# Patient Record
Sex: MALE | Race: ASIAN | Employment: UNEMPLOYED | URBAN - METROPOLITAN AREA
[De-identification: names, ages, dates, MRNs, and addresses within clinical notes are randomized per-mention and may not be internally consistent; named-entity substitution may affect disease eponyms.]

---

## 2017-04-21 DIAGNOSIS — K74.60 CHRONIC HEPATITIS B WITH CIRRHOSIS (H): Primary | ICD-10-CM

## 2017-04-21 DIAGNOSIS — B18.1 CHRONIC HEPATITIS B WITH CIRRHOSIS (H): Primary | ICD-10-CM

## 2017-05-01 ENCOUNTER — PRE VISIT (OUTPATIENT)
Dept: GASTROENTEROLOGY | Facility: CLINIC | Age: 57
End: 2017-05-01

## 2017-05-01 NOTE — TELEPHONE ENCOUNTER
Unable to reach pt or leave message on both home and cell phone    Lab orders in EPIC and lab appointment made    Pt due for ultrasound which will need to be set up on discharge after appointment    Nicole Mars CMA

## 2017-05-30 ENCOUNTER — TELEPHONE (OUTPATIENT)
Dept: GASTROENTEROLOGY | Facility: CLINIC | Age: 57
End: 2017-05-30

## 2017-06-16 ENCOUNTER — TELEPHONE (OUTPATIENT)
Dept: GASTROENTEROLOGY | Facility: CLINIC | Age: 57
End: 2017-06-16

## 2017-06-16 DIAGNOSIS — K74.60 CHRONIC HEPATITIS B WITH CIRRHOSIS (H): ICD-10-CM

## 2017-06-16 DIAGNOSIS — B18.1 CHRONIC HEPATITIS B WITH CIRRHOSIS (H): ICD-10-CM

## 2017-06-16 NOTE — TELEPHONE ENCOUNTER
Writer called pharmacy and confirmed that refills will be covered through 7/22/17. Patient updated. Will refill on appointment day.

## 2017-06-22 ENCOUNTER — PRE VISIT (OUTPATIENT)
Dept: GASTROENTEROLOGY | Facility: CLINIC | Age: 57
End: 2017-06-22

## 2017-06-22 NOTE — TELEPHONE ENCOUNTER
Was the patient contacted by phone and reminded of the upcoming visit? Yes    Was the patient instructed to bring a current list of all medications to the appointment or instructed to bring in all medication bottles? Yes, patient verbalized understanding    Is it anticipated the patient will need additional appointments? Yes, Testing    Were the additional appointments scheduled? Yes, reminded pt to fast for 8 hours prior to ultrasound, pt verbally understood and agreed.     Was the patient instructed to arrive prior to the appointment time to have ordered labs drawn? Yes, patient verbalized understanding    Were the needed lab orders placed? Yes    Nicole Mars [facility]  6/22/2017 4:51 PM

## 2017-06-26 DIAGNOSIS — K74.60 CHRONIC HEPATITIS B WITH CIRRHOSIS (H): ICD-10-CM

## 2017-06-26 DIAGNOSIS — B18.1 CHRONIC HEPATITIS B WITH CIRRHOSIS (H): ICD-10-CM

## 2017-06-26 LAB
ALBUMIN SERPL-MCNC: 3.7 G/DL (ref 3.4–5)
ALP SERPL-CCNC: 67 U/L (ref 40–150)
ALT SERPL W P-5'-P-CCNC: 31 U/L (ref 0–70)
ANION GAP SERPL CALCULATED.3IONS-SCNC: 6 MMOL/L (ref 3–14)
AST SERPL W P-5'-P-CCNC: 23 U/L (ref 0–45)
BILIRUB DIRECT SERPL-MCNC: 0.2 MG/DL (ref 0–0.2)
BILIRUB SERPL-MCNC: 0.9 MG/DL (ref 0.2–1.3)
BUN SERPL-MCNC: 21 MG/DL (ref 7–30)
CALCIUM SERPL-MCNC: 8.9 MG/DL (ref 8.5–10.1)
CHLORIDE SERPL-SCNC: 106 MMOL/L (ref 94–109)
CO2 SERPL-SCNC: 29 MMOL/L (ref 20–32)
CREAT SERPL-MCNC: 0.89 MG/DL (ref 0.66–1.25)
ERYTHROCYTE [DISTWIDTH] IN BLOOD BY AUTOMATED COUNT: 13.1 % (ref 10–15)
GFR SERPL CREATININE-BSD FRML MDRD: 88 ML/MIN/1.7M2
GLUCOSE SERPL-MCNC: 96 MG/DL (ref 70–99)
HCT VFR BLD AUTO: 43.3 % (ref 40–53)
HGB BLD-MCNC: 15.2 G/DL (ref 13.3–17.7)
INR PPP: 0.98 (ref 0.86–1.14)
MCH RBC QN AUTO: 31.1 PG (ref 26.5–33)
MCHC RBC AUTO-ENTMCNC: 35.1 G/DL (ref 31.5–36.5)
MCV RBC AUTO: 89 FL (ref 78–100)
PLATELET # BLD AUTO: 92 10E9/L (ref 150–450)
POTASSIUM SERPL-SCNC: 3.9 MMOL/L (ref 3.4–5.3)
PROT SERPL-MCNC: 7.1 G/DL (ref 6.8–8.8)
RBC # BLD AUTO: 4.88 10E12/L (ref 4.4–5.9)
SODIUM SERPL-SCNC: 141 MMOL/L (ref 133–144)
WBC # BLD AUTO: 3.6 10E9/L (ref 4–11)

## 2017-06-26 PROCEDURE — 87517 HEPATITIS B DNA QUANT: CPT | Performed by: INTERNAL MEDICINE

## 2017-06-26 PROCEDURE — 85027 COMPLETE CBC AUTOMATED: CPT | Performed by: INTERNAL MEDICINE

## 2017-06-26 PROCEDURE — 85610 PROTHROMBIN TIME: CPT | Performed by: INTERNAL MEDICINE

## 2017-06-26 PROCEDURE — 80048 BASIC METABOLIC PNL TOTAL CA: CPT | Performed by: INTERNAL MEDICINE

## 2017-06-26 PROCEDURE — 36415 COLL VENOUS BLD VENIPUNCTURE: CPT | Performed by: INTERNAL MEDICINE

## 2017-06-26 PROCEDURE — 80076 HEPATIC FUNCTION PANEL: CPT | Performed by: INTERNAL MEDICINE

## 2017-06-27 LAB
HBV DNA SERPL NAA+PROBE-ACNC: NORMAL [IU]/ML
HBV DNA SERPL NAA+PROBE-LOG IU: NORMAL {LOG_IU}/ML

## 2017-06-30 RX ORDER — ENTECAVIR 0.5 MG/1
1 TABLET, FILM COATED ORAL DAILY
Qty: 60 TABLET | Refills: 11 | Status: SHIPPED | OUTPATIENT
Start: 2017-06-30 | End: 2018-06-18

## 2017-07-03 ENCOUNTER — OFFICE VISIT (OUTPATIENT)
Dept: GASTROENTEROLOGY | Facility: CLINIC | Age: 57
End: 2017-07-03
Attending: INTERNAL MEDICINE
Payer: COMMERCIAL

## 2017-07-03 VITALS
HEIGHT: 67 IN | WEIGHT: 122 LBS | BODY MASS INDEX: 19.15 KG/M2 | SYSTOLIC BLOOD PRESSURE: 130 MMHG | DIASTOLIC BLOOD PRESSURE: 76 MMHG | HEART RATE: 76 BPM

## 2017-07-03 DIAGNOSIS — B18.1 CHRONIC HEPATITIS B WITH CIRRHOSIS (H): Primary | ICD-10-CM

## 2017-07-03 DIAGNOSIS — K74.60 CHRONIC HEPATITIS B WITH CIRRHOSIS (H): Primary | ICD-10-CM

## 2017-07-03 PROCEDURE — 99212 OFFICE O/P EST SF 10 MIN: CPT | Mod: ZF

## 2017-07-03 ASSESSMENT — PAIN SCALES - GENERAL: PAINLEVEL: NO PAIN (0)

## 2017-07-03 NOTE — MR AVS SNAPSHOT
After Visit Summary   7/3/2017    Judah Diaz    MRN: [id number]           Patient Information     Date Of Birth          1960        Visit Information        Provider Department      7/3/2017 9:30 AM Julio Barajas MD [facility] Hepatology         Follow-ups after your visit        Follow-up notes from your care team     Return in about 6 months (around 1/3/2018).      Your next 10 appointments already scheduled     Jan 08, 2018  8:00 AM CST   (Arrive by 7:45 AM)   Return General Liver with Julio Beltran MD   [facility] Hepatology ([facility] [place])    [address]  [address] [place] [address] [phone]              Who to contact     If you have questions or need follow up information about today's clinic visit or your schedule please contact [facility] HEPATOLOGY directly at [phone].  Normal or non-critical lab and imaging results will be communicated to you by MyChart, letter or phone within 4 business days after the clinic has received the results. If you do not hear from us within 7 days, please contact the clinic through [facility] or phone. If you have a critical or abnormal lab result, we will notify you by phone as soon as possible.  Submit refill requests through [employer] or call your pharmacy and they will forward the refill request to us. Please allow 3 business days for your refill to be completed.          Additional Information About Your Visit        MyChart Information     [employer] gives you secure access to your electronic health record. If you see a primary care provider, you can also send messages to your care team and make appointments. If you have questions, please call your primary care clinic.  If you do not have a primary care provider, please call [phone] and they will assist you.        Care EveryWhere ID     This is your Care EveryWhere ID. This could be used by other organizations to access your  "[place] medical records  [id number]        Your Vitals Were     Pulse Height BMI (Body Mass Index)             76 1.689 m (5' 6.5\") 19.4 kg/m2          Blood Pressure from Last 3 Encounters:   07/03/17 130/76   11/09/16 131/77   04/08/16 120/77    Weight from Last 3 Encounters:   07/03/17 55.3 kg (122 lb)   11/09/16 56.2 kg (123 lb 12.8 oz)   04/08/16 53.8 kg (118 lb 9.6 oz)              Today, you had the following     No orders found for display       Primary Care Provider Office Phone # Fax #    Elliot Nguyễn [phone]       [facility], [place] [address] [facility] [address]        Goals        General    Psychosocial I want to understand my denial for STD and appeal (pt-stated)     Notes - Note edited  2/2/2016  1:05 PM by Thuy Muñiz LSW    As of today's date 2/2/2016 goal is met at 26 - 50%.   Goal Status:  Showing progress Received assistance with appeal.Waiting,  As of today's date 12/29/2015 goal is met at 0 - 25%.   Goal Status:  Active        Equal Access to Services     OLI PLUMMER : Hadii aad ku hadasho Soomaali, waaxda luqadaha, qaybta kaalmada adeegyada, waxay idiin haypierre rich larobby . So [facility] [phone].    ATENCIÓN: Si habla español, tiene a vazquez disposición servicios gratuitos de asistencia lingüística. Llame al [phone].    We comply with applicable federal civil rights laws and [place] laws. We do not discriminate on the basis of race, color, national origin, age, disability sex, sexual orientation or gender identity.            Thank you!     Thank you for choosing [facility] HEPATOLOGY  for your care. Our goal is always to provide you with excellent care. Hearing back from our patients is one way we can continue to improve our services. Please take a few minutes to complete the written survey that you may receive in the mail after your visit with us. Thank you!             Your Updated Medication List - Protect others around you: " Learn how to safely use, store and throw away your medicines at www.disposemymeds.org.          This list is accurate as of: 7/3/17 10:05 AM.  Always use your most recent med list.                   Brand Name Dispense Instructions for use Diagnosis    entecavir 0.5 MG tablet    BARACLUDE    60 tablet    Take 2 tablets (1 mg) by mouth daily    Chronic hepatitis B with cirrhosis (H)

## 2017-07-03 NOTE — LETTER
"7/3/2017       RE: Judah Diaz  [address]  [place]  [place] [address]     Dear Colleague,    Thank you for referring your patient, Judah Diaz, to the [facility] HEPATOLOGY at [facility]. Please see a copy of my visit note below.    [facility]    Hepatology follow-up    Follow-up visit for HBV cirrhosis    Subjective:  57 year old male    HBV  - dx 2015  - GT-C  - eAg negative, eAb positive  - on entecavir   - HBV DNA undetectable, 6/26/17     Cirrhosis  - dx 2015  - HBV  - hx ascites, resolved with antiviral therapy  - no hx variceal bleed or HE  - EGD Jan 2016- small EV, portal hypertensive gastropathy  - HCC screening- abd U/S Jul 2017    Comes to clinic this AM with son.  Last visit Nov 2016.  No new medications, ER visit or hospital admissions since that time.    Doing well.  Abdomen feels \"tight\" sometimes.  No signs or symptoms specific to liver disease.    Taking Baraclude as prescribed.  No missed doses.  No problems getting medicine.    Patient denies jaundice, lower extremity edema, abdominal distension, lethargy or confusion.    Patient denies melena, hematemesis or hematochezia.    Patient denies fevers, sweats or chills.  Weight stable.    Patient does not drink alcohol.  He continues to work for [employer].      Medical hx Surgical hx   Past Medical History:   Diagnosis Date     Chronic hepatitis B (H)      Cirrhosis (H)      Perforation of tympanic membrane, unspecified      Pupil irregular of right eye 2012      Past Surgical History:   Procedure Laterality Date     ESOPHAGOSCOPY, GASTROSCOPY, DUODENOSCOPY (EGD), COMBINED N/A 1/6/2016    Procedure: COMBINED ESOPHAGOSCOPY, GASTROSCOPY, DUODENOSCOPY (EGD);  Surgeon: Jordan Wilson MD;  [facility]     NO HISTORY OF SURGERY      that he can recall          Medications  Prior to Admission medications    Medication Sig Start Date End Date " "Taking? Authorizing Provider   entecavir (BARACLUDE) 0.5 MG tablet Take 2 tablets (1 mg) by mouth daily 6/30/17  Yes Julio Barajas MD       Allergies  No Known Allergies    Review of systems  A 10-point review of systems was negative    Examination  /76  Pulse 76  Ht 1.689 m (5' 6.5\")  Wt 55.3 kg (122 lb)  BMI 19.4 kg/m2  Body mass index is 19.4 kg/(m^2).    Gen- well, NAD, A+Ox3, normal color  CVS- RRR  RS- CTA  Abd- soft, non-tender, no ascites or organomegaly on palpation or percussion, BS+  Extr- hands normal, no YASMIN  Skin- no rash or jaundice  Neuro- no asterixis  Psych- normal mood    Laboratory  Lab Results   Component Value Date     06/26/2017    POTASSIUM 3.9 06/26/2017    CHLORIDE 106 06/26/2017    CO2 29 06/26/2017    BUN 21 06/26/2017    CR 0.89 06/26/2017       Lab Results   Component Value Date    BILITOTAL 0.9 06/26/2017    ALT 31 06/26/2017    AST 23 06/26/2017    ALKPHOS 67 06/26/2017       Lab Results   Component Value Date    ALBUMIN 3.7 06/26/2017    PROTTOTAL 7.1 06/26/2017        Lab Results   Component Value Date    WBC 3.6 06/26/2017    HGB 15.2 06/26/2017    MCV 89 06/26/2017    PLT 92 06/26/2017       Lab Results   Component Value Date    INR 0.98 06/26/2017       Radiology  Abd U/S July 2017 reviewed    Assessment  57 year old male who presents to clinic for routine follow-up of previously decompensated HBV cirrhosis complicated with ascites and jaundice.  MELD-Na= 6.  Liver function tests normal on antiviral therapy.  No evidence of ascites or hepatic encephalopathy.  Up to date with HCC screening.  Up to date with surveillance of esophageal varices.    Plan  1.  Continue entecavir 1mg PO Q24  2.  Low Na diet  3.  Follow-up in 6 months    Julio Young MD  Hepatology  [facility]        "

## 2017-07-03 NOTE — PROGRESS NOTES
"[facility]    Hepatology follow-up    Follow-up visit for HBV cirrhosis    Subjective:  57 year old male    HBV  - dx 2015  - GT-C  - eAg negative, eAb positive  - on entecavir   - HBV DNA undetectable, 6/26/17     Cirrhosis  - dx 2015  - HBV  - hx ascites, resolved with antiviral therapy  - no hx variceal bleed or HE  - EGD Jan 2016- small EV, portal hypertensive gastropathy  - HCC screening- abd U/S Jul 2017    Comes to clinic this AM with son.  Last visit Nov 2016.  No new medications, ER visit or hospital admissions since that time.    Doing well.  Abdomen feels \"tight\" sometimes.  No signs or symptoms specific to liver disease.    Taking Baraclude as prescribed.  No missed doses.  No problems getting medicine.    Patient denies jaundice, lower extremity edema, abdominal distension, lethargy or confusion.    Patient denies melena, hematemesis or hematochezia.    Patient denies fevers, sweats or chills.  Weight stable.    Patient does not drink alcohol.  He continues to work for [employer].      Medical hx Surgical hx   Past Medical History:   Diagnosis Date     Chronic hepatitis B (H)      Cirrhosis (H)      Perforation of tympanic membrane, unspecified      Pupil irregular of right eye 2012      Past Surgical History:   Procedure Laterality Date     ESOPHAGOSCOPY, GASTROSCOPY, DUODENOSCOPY (EGD), COMBINED N/A 1/6/2016    Procedure: COMBINED ESOPHAGOSCOPY, GASTROSCOPY, DUODENOSCOPY (EGD);  Surgeon: Jordan Wilson MD;  Location:  GI     NO HISTORY OF SURGERY      that he can recall          Medications  Prior to Admission medications    Medication Sig Start Date End Date Taking? Authorizing Provider   entecavir (BARACLUDE) 0.5 MG tablet Take 2 tablets (1 mg) by mouth daily 6/30/17  Yes Julio Barajas MD       Allergies  No Known Allergies    Review of systems  A 10-point review of systems was negative    Examination  /76  Pulse 76  Ht 1.689 m (5' " "6.5\")  Wt 55.3 kg (122 lb)  BMI 19.4 kg/m2  Body mass index is 19.4 kg/(m^2).    Gen- well, NAD, A+Ox3, normal color  CVS- RRR  RS- CTA  Abd- soft, non-tender, no ascites or organomegaly on palpation or percussion, BS+  Extr- hands normal, no YASMIN  Skin- no rash or jaundice  Neuro- no asterixis  Psych- normal mood    Laboratory  Lab Results   Component Value Date     06/26/2017    POTASSIUM 3.9 06/26/2017    CHLORIDE 106 06/26/2017    CO2 29 06/26/2017    BUN 21 06/26/2017    CR 0.89 06/26/2017       Lab Results   Component Value Date    BILITOTAL 0.9 06/26/2017    ALT 31 06/26/2017    AST 23 06/26/2017    ALKPHOS 67 06/26/2017       Lab Results   Component Value Date    ALBUMIN 3.7 06/26/2017    PROTTOTAL 7.1 06/26/2017        Lab Results   Component Value Date    WBC 3.6 06/26/2017    HGB 15.2 06/26/2017    MCV 89 06/26/2017    PLT 92 06/26/2017       Lab Results   Component Value Date    INR 0.98 06/26/2017       Radiology  Abd U/S July 2017 reviewed    Assessment  57 year old male who presents to clinic for routine follow-up of previously decompensated HBV cirrhosis complicated with ascites and jaundice.  MELD-Na= 6.  Liver function tests normal on antiviral therapy.  No evidence of ascites or hepatic encephalopathy.  Up to date with HCC screening.  Up to date with surveillance of esophageal varices.    Plan  1.  Continue entecavir 1mg PO Q24  2.  Low Na diet  3.  Follow-up in 6 months    Julio Young MD  Hepatology  [facility]  "

## 2017-07-03 NOTE — NURSING NOTE
"Chief Complaint   Patient presents with     RECHECK     Cirrhosis, Hep B??       Initial /76  Pulse 76  Ht 1.689 m (5' 6.5\")  Wt 55.3 kg (122 lb)  BMI 19.4 kg/m2 Estimated body mass index is 19.4 kg/(m^2) as calculated from the following:    Height as of this encounter: 1.689 m (5' 6.5\").    Weight as of this encounter: 55.3 kg (122 lb).  Medication Reconciliation: complete  "

## 2017-12-14 DIAGNOSIS — B18.1 CHRONIC HEPATITIS B WITH CIRRHOSIS (H): Primary | ICD-10-CM

## 2017-12-14 DIAGNOSIS — K74.60 CHRONIC HEPATITIS B WITH CIRRHOSIS (H): Primary | ICD-10-CM

## 2018-01-08 ENCOUNTER — OFFICE VISIT (OUTPATIENT)
Dept: GASTROENTEROLOGY | Facility: CLINIC | Age: 58
End: 2018-01-08
Attending: INTERNAL MEDICINE
Payer: COMMERCIAL

## 2018-01-08 VITALS
HEART RATE: 68 BPM | BODY MASS INDEX: 19.53 KG/M2 | SYSTOLIC BLOOD PRESSURE: 113 MMHG | DIASTOLIC BLOOD PRESSURE: 76 MMHG | HEIGHT: 67 IN | TEMPERATURE: 98.2 F | WEIGHT: 124.4 LBS

## 2018-01-08 DIAGNOSIS — B18.1 CHRONIC HEPATITIS B WITH CIRRHOSIS (H): ICD-10-CM

## 2018-01-08 DIAGNOSIS — K74.60 CHRONIC HEPATITIS B WITH CIRRHOSIS (H): Primary | ICD-10-CM

## 2018-01-08 DIAGNOSIS — B18.1 CHRONIC HEPATITIS B WITH CIRRHOSIS (H): Primary | ICD-10-CM

## 2018-01-08 DIAGNOSIS — K74.60 CHRONIC HEPATITIS B WITH CIRRHOSIS (H): ICD-10-CM

## 2018-01-08 LAB
ALBUMIN SERPL-MCNC: 3.7 G/DL (ref 3.4–5)
ALP SERPL-CCNC: 56 U/L (ref 40–150)
ALT SERPL W P-5'-P-CCNC: 29 U/L (ref 0–70)
ANION GAP SERPL CALCULATED.3IONS-SCNC: 5 MMOL/L (ref 3–14)
AST SERPL W P-5'-P-CCNC: 23 U/L (ref 0–45)
BILIRUB DIRECT SERPL-MCNC: 0.2 MG/DL (ref 0–0.2)
BILIRUB SERPL-MCNC: 1 MG/DL (ref 0.2–1.3)
BUN SERPL-MCNC: 16 MG/DL (ref 7–30)
CALCIUM SERPL-MCNC: 8.7 MG/DL (ref 8.5–10.1)
CHLORIDE SERPL-SCNC: 107 MMOL/L (ref 94–109)
CO2 SERPL-SCNC: 26 MMOL/L (ref 20–32)
CREAT SERPL-MCNC: 0.91 MG/DL (ref 0.66–1.25)
ERYTHROCYTE [DISTWIDTH] IN BLOOD BY AUTOMATED COUNT: 12.8 % (ref 10–15)
GFR SERPL CREATININE-BSD FRML MDRD: 85 ML/MIN/1.7M2
GLUCOSE SERPL-MCNC: 106 MG/DL (ref 70–99)
HCT VFR BLD AUTO: 45.4 % (ref 40–53)
HGB BLD-MCNC: 15.4 G/DL (ref 13.3–17.7)
INR PPP: 1.01 (ref 0.86–1.14)
MCH RBC QN AUTO: 29.6 PG (ref 26.5–33)
MCHC RBC AUTO-ENTMCNC: 33.9 G/DL (ref 31.5–36.5)
MCV RBC AUTO: 87 FL (ref 78–100)
PLATELET # BLD AUTO: 96 10E9/L (ref 150–450)
POTASSIUM SERPL-SCNC: 3.7 MMOL/L (ref 3.4–5.3)
PROT SERPL-MCNC: 7.3 G/DL (ref 6.8–8.8)
RBC # BLD AUTO: 5.2 10E12/L (ref 4.4–5.9)
SODIUM SERPL-SCNC: 139 MMOL/L (ref 133–144)
WBC # BLD AUTO: 4 10E9/L (ref 4–11)

## 2018-01-08 PROCEDURE — 80048 BASIC METABOLIC PNL TOTAL CA: CPT | Performed by: INTERNAL MEDICINE

## 2018-01-08 PROCEDURE — G0463 HOSPITAL OUTPT CLINIC VISIT: HCPCS | Mod: ZF

## 2018-01-08 PROCEDURE — 85610 PROTHROMBIN TIME: CPT | Performed by: INTERNAL MEDICINE

## 2018-01-08 PROCEDURE — 85027 COMPLETE CBC AUTOMATED: CPT | Performed by: INTERNAL MEDICINE

## 2018-01-08 PROCEDURE — 87517 HEPATITIS B DNA QUANT: CPT | Performed by: INTERNAL MEDICINE

## 2018-01-08 PROCEDURE — 80076 HEPATIC FUNCTION PANEL: CPT | Performed by: INTERNAL MEDICINE

## 2018-01-08 PROCEDURE — 36415 COLL VENOUS BLD VENIPUNCTURE: CPT | Performed by: INTERNAL MEDICINE

## 2018-01-08 ASSESSMENT — PAIN SCALES - GENERAL: PAINLEVEL: NO PAIN (0)

## 2018-01-08 NOTE — PROGRESS NOTES
[facility]    Hepatology follow-up    Follow-up visit for HBV, cirrhosis    Subjective:  57 year old male    HBV  - dx 2015  - GT-C  - eAg negative, eAb positive  - on entecavir   - HBV DNA undetectable, 6/26/17      Cirrhosis  - dx 2015  - HBV  - hx ascites, resolved with antiviral therapy  - no hx variceal bleed or HE  - EGD Jan 2016- small EV, portal hypertensive gastropathy  - HCC screening- abd U/S Jul 2017    The patient comes to clinic this morning for followup of hepatitis B related cirrhosis.  Last clinic visit was 07/2017.  Since then, the patient has been doing well.  He had an influenza vaccination earlier in the season.      The patient is well.  He is wondering if loose skin under his chin is related to his entecavir.  He does not accept that this may be related to getting older.  He denies any signs or symptoms specific to liver disease.     Patient denies jaundice, lower extremity edema, abdominal distension, lethargy or confusion.    Patient denies melena, hematemesis or hematochezia.    Patient denies fevers, sweats or chills.  Weight stable.    Patient does not drink alcohol.      Medical hx Surgical hx   Past Medical History:   Diagnosis Date     Chronic hepatitis B (H)      Cirrhosis (H)      Perforation of tympanic membrane, unspecified      Pupil irregular of right eye 2012      Past Surgical History:   Procedure Laterality Date     ESOPHAGOSCOPY, GASTROSCOPY, DUODENOSCOPY (EGD), COMBINED N/A 1/6/2016    Procedure: COMBINED ESOPHAGOSCOPY, GASTROSCOPY, DUODENOSCOPY (EGD);  Surgeon: Jordan Wilson MD;  [facility]     NO HISTORY OF SURGERY      that he can recall          Medications  Prior to Admission medications    Medication Sig Start Date End Date Taking? Authorizing Provider   entecavir (BARACLUDE) 0.5 MG tablet Take 2 tablets (1 mg) by mouth daily 6/30/17  Yes Julio Barajas MD       Allergies  No Known Allergies    Review of systems  A  "10-point review of systems was negative    Examination  /76  Pulse 68  Temp 98.2  F (36.8  C) (Oral)  Ht 1.702 m (5' 7\")  Wt 56.4 kg (124 lb 6.4 oz)  BMI 19.48 kg/m2  Body mass index is 19.48 kg/(m^2).    Gen- well, NAD, A+Ox3, normal color  CVS- RRR  RS- CTA  Abd- soft, non-tender, no ascites or organomegaly on palpation or percussion, BS+  Extr- hands normal, no YASMIN  Skin- no rash or jaundice  Neuro- no asterixis  Psych- normal mood    Laboratory  Lab Results   Component Value Date     06/26/2017    POTASSIUM 3.9 06/26/2017    CHLORIDE 106 06/26/2017    CO2 29 06/26/2017    BUN 21 06/26/2017    CR 0.89 06/26/2017       Lab Results   Component Value Date    BILITOTAL 0.9 06/26/2017    ALT 31 06/26/2017    AST 23 06/26/2017    ALKPHOS 67 06/26/2017       Lab Results   Component Value Date    ALBUMIN 3.7 06/26/2017    PROTTOTAL 7.1 06/26/2017        Lab Results   Component Value Date    WBC 3.6 06/26/2017    HGB 15.2 06/26/2017    MCV 89 06/26/2017    PLT 92 06/26/2017       Lab Results   Component Value Date    INR 0.98 06/26/2017       Radiology  Abd U/S July 2017 reviewed    Assessment  57-year-old male who presents to clinic for routine follow-up of a history of decompensated HBV cirrhosis with previous history of ascites and jaundice.  MELD-Na= 6(stable).  Liver function tests remain normal on antiviral therapy.  No evidence of ascites or hepatic encephalopathy today.  Due for HCC screening.  Up-to-date with surveillance of esophageal varices.     Plan  1.  Continue entecavir 1mg PO Q24  2.  Abd U/S  3.  Follow-up in 6 months    Julio Young MD  Hepatology  [facility]  "

## 2018-01-08 NOTE — LETTER
1/8/2018      RE: Judah Diaz  [address]  [place]  [place] [address]       [facility]    Hepatology follow-up    Follow-up visit for HBV, cirrhosis    Subjective:  57 year old male    HBV  - dx 2015  - GT-C  - eAg negative, eAb positive  - on entecavir   - HBV DNA undetectable, 6/26/17      Cirrhosis  - dx 2015  - HBV  - hx ascites, resolved with antiviral therapy  - no hx variceal bleed or HE  - EGD Jan 2016- small EV, portal hypertensive gastropathy  - HCC screening- abd U/S Jul 2017    The patient comes to clinic this morning for followup of hepatitis B related cirrhosis.  Last clinic visit was 07/2017.  Since then, the patient has been doing well.  He had an influenza vaccination earlier in the season.      The patient is well.  He is wondering if loose skin under his chin is related to his entecavir.  He does not accept that this may be related to getting older.  He denies any signs or symptoms specific to liver disease.     Patient denies jaundice, lower extremity edema, abdominal distension, lethargy or confusion.    Patient denies melena, hematemesis or hematochezia.    Patient denies fevers, sweats or chills.  Weight stable.    Patient does not drink alcohol.      Medical hx Surgical hx   Past Medical History:   Diagnosis Date     Chronic hepatitis B (H)      Cirrhosis (H)      Perforation of tympanic membrane, unspecified      Pupil irregular of right eye 2012      Past Surgical History:   Procedure Laterality Date     ESOPHAGOSCOPY, GASTROSCOPY, DUODENOSCOPY (EGD), COMBINED N/A 1/6/2016    Procedure: COMBINED ESOPHAGOSCOPY, GASTROSCOPY, DUODENOSCOPY (EGD);  Surgeon: Jordan Wilson MD;  Location:  GI     NO HISTORY OF SURGERY      that he can recall          Medications  Prior to Admission medications    Medication Sig Start Date End Date Taking? Authorizing Provider   entecavir (BARACLUDE) 0.5 MG tablet Take 2 tablets (1 mg) by mouth daily  "6/30/17  Yes Julio Barajas MD       Allergies  No Known Allergies    Review of systems  A 10-point review of systems was negative    Examination  /76  Pulse 68  Temp 98.2  F (36.8  C) (Oral)  Ht 1.702 m (5' 7\")  Wt 56.4 kg (124 lb 6.4 oz)  BMI 19.48 kg/m2  Body mass index is 19.48 kg/(m^2).    Gen- well, NAD, A+Ox3, normal color  CVS- RRR  RS- CTA  Abd- soft, non-tender, no ascites or organomegaly on palpation or percussion, BS+  Extr- hands normal, no YASMIN  Skin- no rash or jaundice  Neuro- no asterixis  Psych- normal mood    Laboratory  Lab Results   Component Value Date     06/26/2017    POTASSIUM 3.9 06/26/2017    CHLORIDE 106 06/26/2017    CO2 29 06/26/2017    BUN 21 06/26/2017    CR 0.89 06/26/2017       Lab Results   Component Value Date    BILITOTAL 0.9 06/26/2017    ALT 31 06/26/2017    AST 23 06/26/2017    ALKPHOS 67 06/26/2017       Lab Results   Component Value Date    ALBUMIN 3.7 06/26/2017    PROTTOTAL 7.1 06/26/2017        Lab Results   Component Value Date    WBC 3.6 06/26/2017    HGB 15.2 06/26/2017    MCV 89 06/26/2017    PLT 92 06/26/2017       Lab Results   Component Value Date    INR 0.98 06/26/2017       Radiology  Abd U/S July 2017 reviewed    Assessment  57-year-old male who presents to clinic for routine follow-up of a history of decompensated HBV cirrhosis with previous history of ascites and jaundice.  MELD-Na= 6(stable).  Liver function tests remain normal on antiviral therapy.  No evidence of ascites or hepatic encephalopathy today.  Due for HCC screening.  Up-to-date with surveillance of esophageal varices.     Plan  1.  Continue entecavir 1mg PO Q24  2.  Abd U/S  3.  Follow-up in 6 months    Julio Young MD  Hepatology  [facility]        "

## 2018-01-08 NOTE — MR AVS SNAPSHOT
After Visit Summary   1/8/2018    Judah Diaz    MRN: [id number]           Patient Information     Date Of Birth          1960        Visit Information        Provider Department      1/8/2018 8:00 AM Julio Barajas MD [facility] Hepatology         Follow-ups after your visit        Follow-up notes from your care team     Return in about 6 months (around 7/8/2018).      Your next 10 appointments already scheduled     Jan 08, 2018  8:45 AM CST   Lab with  LAB   [facility] Lab ([facility])    [address] [place] [address] [phone]            Jan 13, 2018  8:00 AM CST   US ABDOMEN COMPLETE with UCUS3   [facility] US ([facility])    [address] [place], [place] [address] [phone]           Please bring a list of your medicines (including vitamins, minerals and over-the-counter drugs). Also, tell your doctor about any allergies you may have. Wear comfortable clothes and leave your valuables at home.  Adults: No eating or drinking for 8 hours before the exam. You may take medicine with a small sip of water.  Children: - Children 6+ years: No food or drink for 6 hours before exam. - Children 1-5 years: No food or drink for 4 hours before exam. - Infants, breast-fed: may have breast milk up to 2 hours before exam. - Infants, formula: may have bottle until 4 hours before exam.  Please call the Imaging Department at your exam site with any questions.            Jul 17, 2018  7:00 AM CDT   Lab with  LAB   [facility] Lab ([facility])    [address] [place] [address] [phone]            Jul 17, 2018  8:00 AM CDT   (Arrive by 7:45 AM)   Return General Liver with Julio Beltran MD   [facility] Hepatology ([facility])    [address] [place] [address]  [address] [place] [address]  "  [phone]              Who to contact     If you have questions or need follow up information about today's clinic visit or your schedule please contact [facility] HEPATOLOGY directly at [phone].  Normal or non-critical lab and imaging results will be communicated to you by MyChart, letter or phone within 4 business days after the clinic has received the results. If you do not hear from us within 7 days, please contact the clinic through MyChart or phone. If you have a critical or abnormal lab result, we will notify you by phone as soon as possible.  Submit refill requests through [employer] or call your pharmacy and they will forward the refill request to us. Please allow 3 business days for your refill to be completed.          Additional Information About Your Visit        [employer] Information     [employer] gives you secure access to your electronic health record. If you see a primary care provider, you can also send messages to your care team and make appointments. If you have questions, please call your primary care clinic.  If you do not have a primary care provider, please call [phone] and they will assist you.        Care EveryWhere ID     This is your Care EveryWhere ID. This could be used by other organizations to access your [place] medical records  [id number]        Your Vitals Were     Pulse Temperature Height BMI (Body Mass Index)          68 98.2  F (36.8  C) (Oral) 1.702 m (5' 7\") 19.48 kg/m2         Blood Pressure from Last 3 Encounters:   01/08/18 113/76   07/03/17 130/76   11/09/16 131/77    Weight from Last 3 Encounters:   01/08/18 56.4 kg (124 lb 6.4 oz)   07/03/17 55.3 kg (122 lb)   11/09/16 56.2 kg (123 lb 12.8 oz)              Today, you had the following     No orders found for display       Primary Care Provider Office Phone # Fax #    Elliot Nguyễn [phone]       [address]  [facility] [address]        Goals        General    Psychosocial I want to " understand my denial for STD and appeal (pt-stated)     Notes - Note edited  2/2/2016  1:05 PM by Thuy Muñiz LSW    As of today's date 2/2/2016 goal is met at 26 - 50%.   Goal Status:  Showing progress Received assistance with appeal.Waiting,  As of today's date 12/29/2015 goal is met at 0 - 25%.   Goal Status:  Active        Equal Access to Services     [facility]: Hadii aad ku hadasho Soomaali, waaxda luqadaha, qaybta kaalmada adeegyada, waxay idiin hayaan adeeg kharash la'aan ah. So [facility] [phone].    ATENCIÓN: Si habla español, tiene a vazquez disposición servicios gratuitos de asistencia lingüística. Llame al [phone].    We comply with applicable federal civil rights laws and [place] laws. We do not discriminate on the basis of race, color, national origin, age, disability, sex, sexual orientation, or gender identity.            Thank you!     Thank you for choosing [facility] HEPATOLOGY  for your care. Our goal is always to provide you with excellent care. Hearing back from our patients is one way we can continue to improve our services. Please take a few minutes to complete the written survey that you may receive in the mail after your visit with us. Thank you!             Your Updated Medication List - Protect others around you: Learn how to safely use, store and throw away your medicines at www.disposemymeds.org.          This list is accurate as of: 1/8/18  8:35 AM.  Always use your most recent med list.                   Brand Name Dispense Instructions for use Diagnosis    entecavir 0.5 MG tablet    BARACLUDE    60 tablet    Take 2 tablets (1 mg) by mouth daily    Chronic hepatitis B with cirrhosis (H)

## 2018-01-08 NOTE — NURSING NOTE
"Chief Complaint   Patient presents with     RECHECK     follow up with Hep B and cirrhosis, tzimmer cma       Initial /76  Pulse 68  Temp 98.2  F (36.8  C) (Oral)  Ht 1.702 m (5' 7\")  Wt 56.4 kg (124 lb 6.4 oz)  BMI 19.48 kg/m2 Estimated body mass index is 19.48 kg/(m^2) as calculated from the following:    Height as of this encounter: 1.702 m (5' 7\").    Weight as of this encounter: 56.4 kg (124 lb 6.4 oz).  Medication Reconciliation: complete    "

## 2018-01-09 LAB
HBV DNA SERPL NAA+PROBE-ACNC: NORMAL [IU]/ML
HBV DNA SERPL NAA+PROBE-LOG IU: NORMAL {LOG_IU}/ML

## 2018-01-13 ENCOUNTER — RADIANT APPOINTMENT (OUTPATIENT)
Dept: ULTRASOUND IMAGING | Facility: CLINIC | Age: 58
End: 2018-01-13
Attending: INTERNAL MEDICINE
Payer: COMMERCIAL

## 2018-01-13 DIAGNOSIS — B18.1 CHRONIC HEPATITIS B WITH CIRRHOSIS (H): ICD-10-CM

## 2018-01-13 DIAGNOSIS — K74.60 CHRONIC HEPATITIS B WITH CIRRHOSIS (H): ICD-10-CM

## 2018-06-18 DIAGNOSIS — K74.60 CHRONIC HEPATITIS B WITH CIRRHOSIS (H): ICD-10-CM

## 2018-06-18 DIAGNOSIS — B18.1 CHRONIC HEPATITIS B WITH CIRRHOSIS (H): ICD-10-CM

## 2018-06-18 RX ORDER — ENTECAVIR 0.5 MG/1
TABLET, FILM COATED ORAL
Qty: 60 TABLET | Refills: 10 | Status: SHIPPED | OUTPATIENT
Start: 2018-06-18 | End: 2018-08-27

## 2018-06-20 DIAGNOSIS — B18.1 CHRONIC HEPATITIS B WITH CIRRHOSIS (H): Primary | ICD-10-CM

## 2018-06-20 DIAGNOSIS — K74.60 CHRONIC HEPATITIS B WITH CIRRHOSIS (H): Primary | ICD-10-CM

## 2018-07-15 DIAGNOSIS — K74.60 CHRONIC HEPATITIS B WITH CIRRHOSIS (H): ICD-10-CM

## 2018-07-15 DIAGNOSIS — B18.1 CHRONIC HEPATITIS B WITH CIRRHOSIS (H): ICD-10-CM

## 2018-07-15 LAB
ERYTHROCYTE [DISTWIDTH] IN BLOOD BY AUTOMATED COUNT: 13.1 % (ref 10–15)
HCT VFR BLD AUTO: 42.3 % (ref 40–53)
HGB BLD-MCNC: 14.6 G/DL (ref 13.3–17.7)
MCH RBC QN AUTO: 30.4 PG (ref 26.5–33)
MCHC RBC AUTO-ENTMCNC: 34.5 G/DL (ref 31.5–36.5)
MCV RBC AUTO: 88 FL (ref 78–100)
PLATELET # BLD AUTO: 106 10E9/L (ref 150–450)
RBC # BLD AUTO: 4.8 10E12/L (ref 4.4–5.9)
WBC # BLD AUTO: 4 10E9/L (ref 4–11)

## 2018-07-15 PROCEDURE — 80076 HEPATIC FUNCTION PANEL: CPT | Performed by: INTERNAL MEDICINE

## 2018-07-15 PROCEDURE — 80048 BASIC METABOLIC PNL TOTAL CA: CPT | Performed by: INTERNAL MEDICINE

## 2018-07-15 PROCEDURE — 85027 COMPLETE CBC AUTOMATED: CPT | Performed by: INTERNAL MEDICINE

## 2018-07-15 PROCEDURE — 85610 PROTHROMBIN TIME: CPT | Performed by: INTERNAL MEDICINE

## 2018-07-15 PROCEDURE — 36415 COLL VENOUS BLD VENIPUNCTURE: CPT | Performed by: INTERNAL MEDICINE

## 2018-07-15 PROCEDURE — 87517 HEPATITIS B DNA QUANT: CPT | Performed by: INTERNAL MEDICINE

## 2018-07-16 LAB
ALBUMIN SERPL-MCNC: 3.8 G/DL (ref 3.4–5)
ALP SERPL-CCNC: 58 U/L (ref 40–150)
ALT SERPL W P-5'-P-CCNC: 32 U/L (ref 0–70)
ANION GAP SERPL CALCULATED.3IONS-SCNC: 8 MMOL/L (ref 3–14)
AST SERPL W P-5'-P-CCNC: 30 U/L (ref 0–45)
BILIRUB DIRECT SERPL-MCNC: 0.2 MG/DL (ref 0–0.2)
BILIRUB SERPL-MCNC: 0.8 MG/DL (ref 0.2–1.3)
BUN SERPL-MCNC: 23 MG/DL (ref 7–30)
CALCIUM SERPL-MCNC: 8.6 MG/DL (ref 8.5–10.1)
CHLORIDE SERPL-SCNC: 109 MMOL/L (ref 94–109)
CO2 SERPL-SCNC: 25 MMOL/L (ref 20–32)
CREAT SERPL-MCNC: 0.92 MG/DL (ref 0.66–1.25)
GFR SERPL CREATININE-BSD FRML MDRD: 84 ML/MIN/1.7M2
GLUCOSE SERPL-MCNC: 101 MG/DL (ref 70–99)
INR PPP: 0.99 (ref 0.86–1.14)
POTASSIUM SERPL-SCNC: 3.9 MMOL/L (ref 3.4–5.3)
PROT SERPL-MCNC: 7 G/DL (ref 6.8–8.8)
SODIUM SERPL-SCNC: 142 MMOL/L (ref 133–144)

## 2018-07-17 ENCOUNTER — OFFICE VISIT (OUTPATIENT)
Dept: GASTROENTEROLOGY | Facility: CLINIC | Age: 58
End: 2018-07-17
Attending: INTERNAL MEDICINE
Payer: COMMERCIAL

## 2018-07-17 VITALS
DIASTOLIC BLOOD PRESSURE: 81 MMHG | WEIGHT: 118.6 LBS | BODY MASS INDEX: 18.62 KG/M2 | OXYGEN SATURATION: 98 % | HEIGHT: 67 IN | TEMPERATURE: 97.9 F | SYSTOLIC BLOOD PRESSURE: 134 MMHG | HEART RATE: 50 BPM

## 2018-07-17 DIAGNOSIS — K74.60 CHRONIC HEPATITIS B WITH CIRRHOSIS (H): Primary | ICD-10-CM

## 2018-07-17 DIAGNOSIS — B18.1 CHRONIC HEPATITIS B WITH CIRRHOSIS (H): Primary | ICD-10-CM

## 2018-07-17 LAB
HBV DNA SERPL NAA+PROBE-ACNC: NORMAL [IU]/ML
HBV DNA SERPL NAA+PROBE-LOG IU: NORMAL {LOG_IU}/ML

## 2018-07-17 PROCEDURE — G0463 HOSPITAL OUTPT CLINIC VISIT: HCPCS | Mod: ZF

## 2018-07-17 ASSESSMENT — PAIN SCALES - GENERAL: PAINLEVEL: NO PAIN (0)

## 2018-07-17 NOTE — MR AVS SNAPSHOT
After Visit Summary   7/17/2018    Judah Diaz    MRN: [id number]           Patient Information     Date Of Birth          1960        Visit Information        Provider Department      7/17/2018 8:00 AM Julio Barajas MD [facility] Hepatology        Today's Diagnoses     Chronic hepatitis B with cirrhosis (H)    -  1       Follow-ups after your visit        Follow-up notes from your care team     Return in about 6 months (around 1/17/2019).      Your next 10 appointments already scheduled     Aug 06, 2018  8:15 AM CDT   US ABDOMEN COMPLETE with BKUS1   [facility] ([facility])    [address] [place] [address] [phone]           Please bring a list of your medicines (including vitamins, minerals and over-the-counter drugs). Also, tell your doctor about any allergies you may have. Wear comfortable clothes and leave your valuables at home.  Adults: No eating, smoking, gum chewing or drinking for 8 hours before the exam. You may take medicine with a small sip of water.  Children: - Infants, breast-fed: may have breast milk up to 2 hours before exam. - Infants, formula: may have bottle until 4 hours before exam. - Children 1-5 years: No food or drink for 4 hours before exam. - Children 6 -12 years: No food or drink for 6 hours before exam. - Children over 12 years: No food or drink for 8 hours before exam. - J Tube Fed: No need to stop feedings.  Please call the Imaging Department at your exam site with any questions.            Roberto 15, 2019  7:30 AM CST   Lab with  LAB   [facility] Lab [facility])    [address] [place] [address] [phone]            Roberto 15, 2019  8:30 AM CST   (Arrive by 8:15 AM)   Return General Liver with Julio Beltran MD   [facility] Hepatology ([facility])    [address] [place] [address]  [address] [place] [address]  "[place] [id number] [phone]              Future tests that were ordered for you today     Open Future Orders        Priority Expected Expires Ordered    US Abdomen Complete Routine 7/18/2018 7/17/2019 7/17/2018    UPPER GI ENDOSCOPY Routine  8/31/2018 7/17/2018            Who to contact     If you have questions or need follow up information about today's clinic visit or your schedule please contact [facility] HEPATOLOGY directly at [phone].  Normal or non-critical lab and imaging results will be communicated to you by Ch[employer]an[employer], letter or phone within 4 business days after the clinic has received the results. If you do not hear from us within 7 days, please contact the clinic through [employer] or phone. If you have a critical or abnormal lab result, we will notify you by phone as soon as possible.  Submit refill requests through [employer] or call your pharmacy and they will forward the refill request to us. Please allow 3 business days for your refill to be completed.          Additional Information About Your Visit        [employer] Information     [employer] gives you secure access to your electronic health record. If you see a primary care provider, you can also send messages to your care team and make appointments. If you have questions, please call your primary care clinic.  If you do not have a primary care provider, please call [phone] and they will assist you.        Care EveryWhere ID     This is your Care EveryWhere ID. This could be used by other organizations to access your [place] medical records  [id number]        Your Vitals Were     Pulse Temperature Height Pulse Oximetry BMI (Body Mass Index)       50 97.9  F (36.6  C) (Oral) 1.702 m (5' 7\") 98% 18.58 kg/m2        Blood Pressure from Last 3 Encounters:   07/17/18 134/81   01/08/18 113/76   07/03/17 130/76    Weight from Last 3 Encounters:   07/17/18 53.8 kg (118 lb 9.6 oz)   01/08/18 56.4 kg (124 lb 6.4 oz)   07/03/17 55.3 kg (122 lb)               " Primary Care Provider Office Phone # Fax #    Elliot Nguyễn [phone]       [address]  [place] [facility] [address]        Goals        General    Psychosocial I want to understand my denial for STD and appeal (pt-stated)     Notes - Note edited  2/2/2016  1:05 PM by Thuy Muñiz LSW    As of today's date 2/2/2016 goal is met at 26 - 50%.   Goal Status:  Showing progress Received assistance with appeal.Waiting,  As of today's date 12/29/2015 goal is met at 0 - 25%.   Goal Status:  Active        Equal Access to Services     [facility]: Hadii aad ku hadasho Soomaali, waaxda luqadaha, qaybta kaalmada adeegyada, waxay idiin hayaan adeclaritza kharazonia alexandra . So [facility] [phone].    ATENCIÓN: Si habla español, tiene a vazquez disposición servicios gratuitos de asistencia lingüística. Llame al [phone].    We comply with applicable federal civil rights laws and [place] laws. We do not discriminate on the basis of race, color, national origin, age, disability, sex, sexual orientation, or gender identity.            Thank you!     Thank you for choosing [facility] HEPATOLOGY  for your care. Our goal is always to provide you with excellent care. Hearing back from our patients is one way we can continue to improve our services. Please take a few minutes to complete the written survey that you may receive in the mail after your visit with us. Thank you!             Your Updated Medication List - Protect others around you: Learn how to safely use, store and throw away your medicines at www.disposemymeds.org.          This list is accurate as of 7/17/18  8:35 AM.  Always use your most recent med list.                   Brand Name Dispense Instructions for use Diagnosis    entecavir 0.5 MG tablet    BARACLUDE    60 tablet    TAKE 2 TABLETS (1 MG) BY MOUTH DAILY    Chronic hepatitis B with cirrhosis (H)

## 2018-07-17 NOTE — NURSING NOTE
Chief Complaint   Patient presents with     RECHECK     Cirrhosis of Liver Hep B   Pt roomed, vitals, meds, and allergies reviewed with pt. Pt ready for provider.  Roberto Bustillos, CMA

## 2018-07-17 NOTE — PROGRESS NOTES
[facility]    Hepatology follow-up    Follow-up visit for HBV cirrhosis    Subjective:  58 year old male    HBV  - dx 2015  - GT-C  - eAg negative, eAb positive  - on entecavir since 2015  - HBV DNA undetectable, 6/26/17      Cirrhosis  - dx 2015  - HBV  - hx ascites, resolved with antiviral therapy  - no hx variceal bleed or HE  - EGD Jan 2016- small EV, portal hypertensive gastropathy  - HCC screening- abd U/S Jan 2018    Comes to clinic this AM with wife and grandson Dannie for follow-up of HBV.  Last clinic visit Jan 2018.  Patient denies new medications, ER visits or hospital admissions since last clinic visit.    Patient is well today.  He denies any signs or symptoms specific to liver disease.    Patient denies jaundice, lower extremity edema, abdominal distension, lethargy or confusion.    Patient denies melena, hematemesis or hematochezia.    Patient denies fevers, sweats or chills.  Weight stable.    Patient takes entecavir every day.  He does not miss doses.  He has no problems obtaining his medications.    Patient does not drink alcohol.  He continues to work for [employer].      Medical hx Surgical hx   Past Medical History:   Diagnosis Date     Chronic hepatitis B (H)      Cirrhosis (H)      Perforation of tympanic membrane, unspecified      Pupil irregular of right eye 2012      Past Surgical History:   Procedure Laterality Date     ESOPHAGOSCOPY, GASTROSCOPY, DUODENOSCOPY (EGD), COMBINED N/A 1/6/2016    Procedure: COMBINED ESOPHAGOSCOPY, GASTROSCOPY, DUODENOSCOPY (EGD);  Surgeon: Jordan Wilson MD;  Location: [facility]     NO HISTORY OF SURGERY      that he can recall          Medications  Prior to Admission medications    Medication Sig Start Date End Date Taking? Authorizing Provider   entecavir (BARACLUDE) 0.5 MG tablet TAKE 2 TABLETS (1 MG) BY MOUTH DAILY 6/18/18  Yes Julio Barajas MD       Allergies  No Known Allergies    Review of systems  A  "10-point review of systems was negative    Examination  /81  Pulse 50  Temp 97.9  F (36.6  C) (Oral)  Ht 1.702 m (5' 7\")  Wt 53.8 kg (118 lb 9.6 oz)  SpO2 98%  BMI 18.58 kg/m2  Body mass index is 18.58 kg/(m^2).    Gen- well, NAD, A+Ox3, normal color  CVS- RRR  RS- CTA  Abd- soft, non-tender, no ascites or organomegaly on palpation or percussion, BS+  Extr- hands normal, no YASMIN  Skin- no rash or jaundice  Neuro- no asterixis  Psych- normal mood    Laboratory  Lab Results   Component Value Date     07/15/2018    POTASSIUM 3.9 07/15/2018    CHLORIDE 109 07/15/2018    CO2 25 07/15/2018    BUN 23 07/15/2018    CR 0.92 07/15/2018       Lab Results   Component Value Date    BILITOTAL 0.8 07/15/2018    ALT 32 07/15/2018    AST 30 07/15/2018    ALKPHOS 58 07/15/2018       Lab Results   Component Value Date    ALBUMIN 3.8 07/15/2018    PROTTOTAL 7.0 07/15/2018        Lab Results   Component Value Date    WBC 4.0 07/15/2018    HGB 14.6 07/15/2018    MCV 88 07/15/2018     07/15/2018       Lab Results   Component Value Date    INR 0.99 07/15/2018       Radiology  Nil recent    Assessment  58 year old male who presents for routine follow-up of previously decompensated HBV cirrhosis.  MELD-Na= 6.  No ascites or hepatic encephalopathy.  Liver function tests normal on entecavir.  Due for HCC screening- will monitor small cyst lesion seen on last U/S.  Due for screening for esophageal varices.    Plan  1.  Follow-up HBV DNA  2.  Abd U/S  3.  EGD  4.  Follow-up in 6 months    Julio Young MD  Hepatology  [facility]  "

## 2018-07-17 NOTE — LETTER
7/17/2018      RE: Judah Diaz  [address]  [place] [address]       [facility]    Hepatology follow-up    Follow-up visit for HBV cirrhosis    Subjective:  58 year old male    HBV  - dx 2015  - GT-C  - eAg negative, eAb positive  - on entecavir since 2015  - HBV DNA undetectable, 6/26/17      Cirrhosis  - dx 2015  - HBV  - hx ascites, resolved with antiviral therapy  - no hx variceal bleed or HE  - EGD Jan 2016- small EV, portal hypertensive gastropathy  - HCC screening- abd U/S Jan 2018    Comes to clinic this AM with wife and grandson Dannie for follow-up of HBV.  Last clinic visit Jan 2018.  Patient denies new medications, ER visits or hospital admissions since last clinic visit.    Patient is well today.  He denies any signs or symptoms specific to liver disease.    Patient denies jaundice, lower extremity edema, abdominal distension, lethargy or confusion.    Patient denies melena, hematemesis or hematochezia.    Patient denies fevers, sweats or chills.  Weight stable.    Patient takes entecavir every day.  He does not miss doses.  He has no problems obtaining his medications.    Patient does not drink alcohol.  He continues to work for [employer].      Medical hx Surgical hx   Past Medical History:   Diagnosis Date     Chronic hepatitis B (H)      Cirrhosis (H)      Perforation of tympanic membrane, unspecified      Pupil irregular of right eye 2012      Past Surgical History:   Procedure Laterality Date     ESOPHAGOSCOPY, GASTROSCOPY, DUODENOSCOPY (EGD), COMBINED N/A 1/6/2016    Procedure: COMBINED ESOPHAGOSCOPY, GASTROSCOPY, DUODENOSCOPY (EGD);  Surgeon: Jordan Wilson MD;  Location:  GI     NO HISTORY OF SURGERY      that he can recall          Medications  Prior to Admission medications    Medication Sig Start Date End Date Taking? Authorizing Provider   entecavir (BARACLUDE) 0.5 MG tablet TAKE 2 TABLETS (1 MG) BY MOUTH DAILY  "6/18/18  Yes Julio Barajas MD       Allergies  No Known Allergies    Review of systems  A 10-point review of systems was negative    Examination  /81  Pulse 50  Temp 97.9  F (36.6  C) (Oral)  Ht 1.702 m (5' 7\")  Wt 53.8 kg (118 lb 9.6 oz)  SpO2 98%  BMI 18.58 kg/m2  Body mass index is 18.58 kg/(m^2).    Gen- well, NAD, A+Ox3, normal color  CVS- RRR  RS- CTA  Abd- soft, non-tender, no ascites or organomegaly on palpation or percussion, BS+  Extr- hands normal, no YASMIN  Skin- no rash or jaundice  Neuro- no asterixis  Psych- normal mood    Laboratory  Lab Results   Component Value Date     07/15/2018    POTASSIUM 3.9 07/15/2018    CHLORIDE 109 07/15/2018    CO2 25 07/15/2018    BUN 23 07/15/2018    CR 0.92 07/15/2018       Lab Results   Component Value Date    BILITOTAL 0.8 07/15/2018    ALT 32 07/15/2018    AST 30 07/15/2018    ALKPHOS 58 07/15/2018       Lab Results   Component Value Date    ALBUMIN 3.8 07/15/2018    PROTTOTAL 7.0 07/15/2018        Lab Results   Component Value Date    WBC 4.0 07/15/2018    HGB 14.6 07/15/2018    MCV 88 07/15/2018     07/15/2018       Lab Results   Component Value Date    INR 0.99 07/15/2018       Radiology  Nil recent    Assessment  58 year old male who presents for routine follow-up of previously decompensated HBV cirrhosis.  MELD-Na= 6.  No ascites or hepatic encephalopathy.  Liver function tests normal on entecavir.  Due for HCC screening- will monitor small cyst lesion seen on last U/S.  Due for screening for esophageal varices.    Plan  1.  Follow-up HBV DNA  2.  Abd U/S  3.  EGD  4.  Follow-up in 6 months    Julio Young MD  Hepatology  [facility]    Julio Young MD      "

## 2018-08-09 ENCOUNTER — TELEPHONE (OUTPATIENT)
Dept: GASTROENTEROLOGY | Facility: CLINIC | Age: 58
End: 2018-08-09

## 2018-08-09 NOTE — TELEPHONE ENCOUNTER
Patient scheduled for EGD     Indication for procedure. Chronic hepatitis B with cirrhosis    Referring Provider. Elliot Nguyễn MD    ? No     Arrival time verified? Patient to arrive at 7 am     Facility location verified? [address]     Instructions given regarding prep and procedure. Transportation policy reviewed and verbalized understanding.     Prep Type NPO     Are you taking any anticoagulants or blood thinners? Denies     Instructions given? Yes     Electronic implanted devices? Denies     Pre procedure teaching completed? Yes    Transportation from procedure? Yes, son     H&P / Pre op physical completed? N/A    Oc Miranda RN

## 2018-08-10 ENCOUNTER — RADIANT APPOINTMENT (OUTPATIENT)
Dept: ULTRASOUND IMAGING | Facility: CLINIC | Age: 58
End: 2018-08-10
Attending: INTERNAL MEDICINE
Payer: COMMERCIAL

## 2018-08-10 DIAGNOSIS — K74.60 CHRONIC HEPATITIS B WITH CIRRHOSIS (H): ICD-10-CM

## 2018-08-10 DIAGNOSIS — B18.1 CHRONIC HEPATITIS B WITH CIRRHOSIS (H): ICD-10-CM

## 2018-08-10 PROCEDURE — 76700 US EXAM ABDOM COMPLETE: CPT

## 2018-08-16 ENCOUNTER — SURGERY (OUTPATIENT)
Age: 58
End: 2018-08-16

## 2018-08-16 ENCOUNTER — HOSPITAL ENCOUNTER (OUTPATIENT)
Facility: CLINIC | Age: 58
Discharge: HOME | End: 2018-08-16
Attending: INTERNAL MEDICINE | Admitting: INTERNAL MEDICINE
Payer: COMMERCIAL

## 2018-08-16 VITALS
SYSTOLIC BLOOD PRESSURE: 129 MMHG | OXYGEN SATURATION: 98 % | DIASTOLIC BLOOD PRESSURE: 88 MMHG | RESPIRATION RATE: 10 BRPM | HEART RATE: 50 BPM

## 2018-08-16 LAB — UPPER GI ENDOSCOPY: NORMAL

## 2018-08-16 PROCEDURE — 40000104 ZZH STATISTIC MODERATE SEDATION < 10 MIN: Performed by: INTERNAL MEDICINE

## 2018-08-16 PROCEDURE — 25000128 H RX IP 250 OP 636: Performed by: INTERNAL MEDICINE

## 2018-08-16 PROCEDURE — 43235 EGD DIAGNOSTIC BRUSH WASH: CPT | Performed by: INTERNAL MEDICINE

## 2018-08-16 RX ORDER — ONDANSETRON 2 MG/ML
4 INJECTION INTRAMUSCULAR; INTRAVENOUS
Status: DISCONTINUED | OUTPATIENT
Start: 2018-08-16 | End: 2018-08-16 | Stop reason: HOSPADM

## 2018-08-16 RX ORDER — FENTANYL CITRATE 50 UG/ML
INJECTION, SOLUTION INTRAMUSCULAR; INTRAVENOUS PRN
Status: DISCONTINUED | OUTPATIENT
Start: 2018-08-16 | End: 2018-08-16 | Stop reason: HOSPADM

## 2018-08-16 RX ORDER — NALOXONE HYDROCHLORIDE 0.4 MG/ML
.1-.4 INJECTION, SOLUTION INTRAMUSCULAR; INTRAVENOUS; SUBCUTANEOUS
Status: CANCELLED | OUTPATIENT
Start: 2018-08-16 | End: 2018-08-17

## 2018-08-16 RX ORDER — ONDANSETRON 4 MG/1
4 TABLET, ORALLY DISINTEGRATING ORAL EVERY 6 HOURS PRN
Status: CANCELLED | OUTPATIENT
Start: 2018-08-16

## 2018-08-16 RX ORDER — FLUMAZENIL 0.1 MG/ML
0.2 INJECTION, SOLUTION INTRAVENOUS
Status: CANCELLED | OUTPATIENT
Start: 2018-08-16 | End: 2018-08-16

## 2018-08-16 RX ORDER — LIDOCAINE 40 MG/G
CREAM TOPICAL
Status: DISCONTINUED | OUTPATIENT
Start: 2018-08-16 | End: 2018-08-16 | Stop reason: HOSPADM

## 2018-08-16 RX ORDER — ONDANSETRON 2 MG/ML
4 INJECTION INTRAMUSCULAR; INTRAVENOUS EVERY 6 HOURS PRN
Status: CANCELLED | OUTPATIENT
Start: 2018-08-16

## 2018-08-16 RX ADMIN — MIDAZOLAM 2 MG: 1 INJECTION INTRAMUSCULAR; INTRAVENOUS at 08:09

## 2018-08-16 RX ADMIN — FENTANYL CITRATE 100 MCG: 50 INJECTION, SOLUTION INTRAMUSCULAR; INTRAVENOUS at 08:09

## 2018-08-16 NOTE — IP AVS SNAPSHOT
[facility], [place], Endoscopy    [address] [facility] [address]    Phone:  [phone]                                       After Visit Summary   8/16/2018    Judah Diaz    MRN: [id number]           After Visit Summary Signature Page     I have received my discharge instructions, and my questions have been answered. I have discussed any challenges I see with this plan with the nurse or doctor.    ..........................................................................................................................................  Patient/Patient Representative Signature      ..........................................................................................................................................  Patient Representative Print Name and Relationship to Patient    ..................................................               ................................................  Date                                            Time    ..........................................................................................................................................  Reviewed by Signature/Title    ...................................................              ..............................................  Date                                                            Time

## 2018-08-16 NOTE — OR NURSING
Procedure: EGD with no interventions  Sedation: Concious  O2: 2L  Tolerated Procedure: Well  Patient returned to recovery room in stable condition with ADELINA Najera RN

## 2018-08-16 NOTE — IP AVS SNAPSHOT
MRN:[id number]                      After Visit Summary   8/16/2018    Judah Diaz    MRN: [id number]           Thank you!     Thank you for choosing [facility] for your care. Our goal is always to provide you with excellent care. Hearing back from our patients is one way we can continue to improve our services. Please take a few minutes to complete the written survey that you may receive in the mail after you visit with us. Thank you!        Patient Information     Date Of Birth          1960        About your hospital stay     You were admitted on:  August 16, 2018 You last received care in the:  [facility], Endoscopy    You were discharged on:  August 16, 2018       Who to Call     For medical emergencies, please call 911.  For non-urgent questions about your medical care, please call your primary care provider or clinic, [phone]  For questions related to your surgery, please call your surgery clinic        Attending Provider     Provider Julio Odom MD Gastroenterology       Primary Care Provider Office Phone # Fax #    Elliot Nguyễn -[phone]      Your next 10 appointments already scheduled     Aug 17, 2018  7:20 AM CDT   Office Visit with Elliot Nguyễn MD   [facility] ([facility])    [address] [place] [address] [phone]           Bring a current list of meds and any records pertaining to this visit. For Physicals, please bring immunization records and any forms needing to be filled out. Please arrive 10 minutes early to complete paperwork.            Roberto 15, 2019  7:30 AM CST   Lab with  LAB   [facility] ([facility])    [address] [address] [phone]            Roberto 15, 2019  8:30 AM CST   (Arrive by 8:15 AM)   Return General Liver with Julio Beltran MD   [facility]  Hepatology ([facility])    [address] [facility]  [address]  [facility] [address] [phone]              Further instructions from your care team       Discharge Instructions after  Upper Endoscopy (EGD)    Activity and Diet  You were given medicine for pain. You may be dizzy or sleepy.  For 24 hours:    Do not drive or use heavy equipment.    Do not make important decisions.    Do not drink any alcohol.  __X_ You may return to your regular diet.    Discomfort  You may have a sore throat for 2 to 3 days. It may help to:    Avoid hot liquids for 24 hours.    Use sore throat lozenges.    Gargle as needed with salt water up to 4 times a day. Mix 1 cup of warm water  with 1 teaspoon of salt. Do not swallow.      You may take Tylenol (acetaminophen) for pain unless your doctor has told you not to.      Other instructions________________________________________________________    When to call us:  Problems are rare. Call right away if you have:    Unusual throat pain or trouble swallowing    Unusual pain in belly or chest that is not relieved by belching or passing air    Black stools (tar-like looking bowel movement)    Temperature above 100.6  F. (37.5  C).    If you vomit blood or have severe pain, go to an emergency room.    If you have questions, call:  Monday to Friday, 7 a.m. to 4:30 p.m.: Endoscopy: [phone] (We may have to call you back)    After hours: Hospital: [phone] (Ask for the GI fellow on call)    Pending Results     No orders found from 8/14/2018 to 8/17/2018.            Admission Information     Date & Time Provider Department Dept. Phone    8/16/2018 Julio Barajas MD [facility] [place], Endoscopy [phone]      Your Vitals Were     Blood Pressure Pulse Respirations Pulse Oximetry          105/69 50 7 100%        MyChart Information     [employer] gives you secure access to your electronic health record. If you see a primary care provider, you can also  send messages to your care team and make appointments. If you have questions, please call your primary care clinic.  If you do not have a primary care provider, please call [phone] and they will assist you.        Care EveryWhere ID     This is your Care EveryWhere ID. This could be used by other organizations to access your [facility] medical records  [id number]        Equal Access to Services     [facility]: Hadii marilou hall Sobora, waaxda luqadaha, qaybta kaalmada adeclaritzayada, iván leblancmehdizonia alexandra . So [facility] [phone].    ATENCIÓN: Si habla español, tiene a vazquez disposición servicios gratuitos de asistencia lingüística. Llame al [phone].    We comply with applicable federal civil rights laws and [place] laws. We do not discriminate on the basis of race, color, national origin, age, disability, sex, sexual orientation, or gender identity.               Review of your medicines      UNREVIEWED medicines. Ask your doctor about these medicines        Dose / Directions    entecavir 0.5 MG tablet   Commonly known as:  BARACLUDE   Used for:  Chronic hepatitis B with cirrhosis (H)        TAKE 2 TABLETS (1 MG) BY MOUTH DAILY   Quantity:  60 tablet   Refills:  10                Protect others around you: Learn how to safely use, store and throw away your medicines at www.disposemymeds.org.             Medication List: This is a list of all your medications and when to take them. Check marks below indicate your daily home schedule. Keep this list as a reference.      Medications           Morning Afternoon Evening Bedtime As Needed    entecavir 0.5 MG tablet   Commonly known as:  BARACLUDE   TAKE 2 TABLETS (1 MG) BY MOUTH DAILY

## 2018-08-16 NOTE — DISCHARGE INSTRUCTIONS
Discharge Instructions after  Upper Endoscopy (EGD)    Activity and Diet  You were given medicine for pain. You may be dizzy or sleepy.  For 24 hours:    Do not drive or use heavy equipment.    Do not make important decisions.    Do not drink any alcohol.  __X_ You may return to your regular diet.    Discomfort  You may have a sore throat for 2 to 3 days. It may help to:    Avoid hot liquids for 24 hours.    Use sore throat lozenges.    Gargle as needed with salt water up to 4 times a day. Mix 1 cup of warm water  with 1 teaspoon of salt. Do not swallow.      You may take Tylenol (acetaminophen) for pain unless your doctor has told you not to.      Other instructions________________________________________________________    When to call us:  Problems are rare. Call right away if you have:    Unusual throat pain or trouble swallowing    Unusual pain in belly or chest that is not relieved by belching or passing air    Black stools (tar-like looking bowel movement)    Temperature above 100.6  F. (37.5  C).    If you vomit blood or have severe pain, go to an emergency room.    If you have questions, call:  Monday to Friday, 7 a.m. to 4:30 p.m.: Endoscopy: [phone] (We may have to call you back)    After hours: Hospital: [phone] (Ask for the GI fellow on call)

## 2018-08-17 ENCOUNTER — OFFICE VISIT (OUTPATIENT)
Dept: FAMILY MEDICINE | Facility: CLINIC | Age: 58
End: 2018-08-17
Payer: COMMERCIAL

## 2018-08-17 VITALS
RESPIRATION RATE: 16 BRPM | HEIGHT: 67 IN | HEART RATE: 60 BPM | BODY MASS INDEX: 18.3 KG/M2 | TEMPERATURE: 98.9 F | DIASTOLIC BLOOD PRESSURE: 76 MMHG | WEIGHT: 116.6 LBS | SYSTOLIC BLOOD PRESSURE: 133 MMHG | OXYGEN SATURATION: 100 %

## 2018-08-17 DIAGNOSIS — K74.60 CHRONIC HEPATITIS B WITH CIRRHOSIS (H): ICD-10-CM

## 2018-08-17 DIAGNOSIS — R18.8 CIRRHOSIS OF LIVER WITH ASCITES, UNSPECIFIED HEPATIC CIRRHOSIS TYPE (H): ICD-10-CM

## 2018-08-17 DIAGNOSIS — K76.6 PORTAL HYPERTENSION (H): Primary | ICD-10-CM

## 2018-08-17 DIAGNOSIS — K74.60 CIRRHOSIS OF LIVER WITH ASCITES, UNSPECIFIED HEPATIC CIRRHOSIS TYPE (H): ICD-10-CM

## 2018-08-17 DIAGNOSIS — B18.1 CHRONIC HEPATITIS B WITH CIRRHOSIS (H): ICD-10-CM

## 2018-08-17 PROCEDURE — 99214 OFFICE O/P EST MOD 30 MIN: CPT | Performed by: FAMILY MEDICINE

## 2018-08-17 ASSESSMENT — PAIN SCALES - GENERAL: PAINLEVEL: NO PAIN (0)

## 2018-08-17 NOTE — PATIENT INSTRUCTIONS
Esophageal Varices     With esophageal varices, blood vessels in the esophagus become abnormally enlarged. They may then burst (rupture) and bleed.   Esophageal varices are enlarged veins at the lower end of the esophagus. The esophagus is the tube that carries food from your mouth to your stomach. Varices most often occur because of problems with blood flow in the liver caused by chronic liver disease. Normally, a blood vessel called the portal vein carries blood from the digestive organs to the liver. But with liver disease, blood flow can be blocked due to scarring of the liver. This increases the blood pressure in the portal vein (a condition known as portal hypertension). Blood then backs up in nearby veins in the esophagus and stomach, causing varices. Varices are a serious and deadly problem. Treatment is needed to prevent them from bursting (rupturing) and bleeding. If bleeding occurs, it can be fatal.  Symptoms of esophageal varices  Symptoms do not occur unless the varices are bleeding. This is an emergency problem. If you have any of the following symptoms, get medical attention right away:    Vomiting blood    Black, tarry, or bloody stools    Feeling lightheaded, or fainting (loss of consciousness)  Diagnosing esophageal \varices  You ll likely be checked for varices if you have liver disease or other health problems that can cause them. Your healthcare provider will ask about your symptoms and health history. You ll also be examined. Tests are then done to confirm the problem. Tests can include:    Upper endoscopy. This is done to see inside the upper digestive tract. During the test, an endoscope is used. This is a thin, flexible tube with a tiny camera on the end. It s inserted through your mouth. It s then guided down through your esophagus, stomach, and first part of your small intestine. This allows the provider to check for varices and find any bleeding.    Imaging tests. These provide pictures  of the liver or blood flow in the liver. They allow the provider to check for enlarged veins around the liver and assess the risk of bleeding. Common imaging tests done include ultrasound and CT scans.  Treating esophageal varices  The goal of treatment is to reduce the risk of bleeding or to control bleeding. Treatment can include 1 or more of the following:    Medicines. These may be prescribed to lower the blood pressure inside the enlarged veins. This reduces the risk of bleeding. Beta-blockers are the most common medicine used.    Endoscopic therapy. These are treatments for enlarged or bleeding veins that are done using an endoscope. With ligation, small rubber bands are placed around the veins to close them off and stop any bleeding. With sclerotherapy, a blood-clotting medicine is injected into the veins to cause scarring and shrink them.    Balloon tamponade. A tube with a balloon is guided down into your esophagus and stomach. The balloon is then filled with air. This puts pressure on enlarged or bleeding veins to control bleeding. This is a short-term (temporary) way to control bleeding until other treatments are available.     Surgery. This may be done to place a tubelike device (stent) in the liver. The stent helps redirect blood flow in the liver to lower the blood pressure in enlarged veins. Sometimes, the enlarged veins may be connected to other nearby veins to redirect blood flow. In severe cases, a liver transplant may be needed. For this surgery, a diseased liver is replaced with a healthy liver from another person.   Follow-up  Regular visits with your provider are needed to check for bleeding of the varices. If bleeding occurs, it is likely to occur again. More treatments will then be needed in the future. Once endoscopic therapy (banding) is performed, regular follow-up endoscopic scans with banding are done to completely get rid of the varices. If you are given medicines to take by mouth, be  sure to take them as directed. Work closely with your provider to manage your condition. Know when to seek emergency care.  Date Last Reviewed: 7/1/2016 2000-2017 [employer]. [address] [place] [address], [place] [address]. All rights reserved. This information is not intended as a substitute for professional medical care. Always follow your healthcare professional's instructions.

## 2018-08-17 NOTE — MR AVS SNAPSHOT
After Visit Summary   8/17/2018    Judah Diaz    MRN: [id number]           Patient Information     Date Of Birth          1960        Visit Information        Provider Department      8/17/2018 7:20 AM Elliot Nguyễn MD [facility]        Today's Diagnoses     Portal hypertension (H)    -  1    Chronic hepatitis B with cirrhosis (H)        Cirrhosis of liver with ascites, unspecified hepatic cirrhosis type (H)           Follow-ups after your visit        Your next 10 appointments already scheduled     Roberto 15, 2019  7:30 AM CST   Lab with  LAB   [facility] Lab ([facility])    [address]  1st Floor  [facility] [id number] [phone]            Roberto 15, 2019  8:30 AM CST   (Arrive by 8:15 AM)   Return General Liver with Julio Beltran MD   [facility] Hepatology ([facility])    [address]  [address]  [facility] [id number] [phone]              Who to contact     If you have questions or need follow up information about today's clinic visit or your schedule please contact [facility] directly at [phone].  Normal or non-critical lab and imaging results will be communicated to you by MyChart, letter or phone within 4 business days after the clinic has received the results. If you do not hear from us within 7 days, please contact the clinic through MyChart or phone. If you have a critical or abnormal lab result, we will notify you by phone as soon as possible.  Submit refill requests through [employer] or call your pharmacy and they will forward the refill request to us. Please allow 3 business days for your refill to be completed.          Additional Information About Your Visit        [employer] Information     [employer] gives you secure access to your electronic health record. If you see a primary care provider, you can also send messages to your care team and  "make appointments. If you have questions, please call your primary care clinic.  If you do not have a primary care provider, please call [phone] and they will assist you.        Care EveryWhere ID     This is your Care EveryWhere ID. This could be used by other organizations to access your [facility] medical records  [id number]        Your Vitals Were     Pulse Temperature Respirations Height Pulse Oximetry BMI (Body Mass Index)    60 98.9  F (37.2  C) (Oral) 16 1.702 m (5' 7\") 100% 18.26 kg/m2       Blood Pressure from Last 3 Encounters:   08/17/18 133/76   08/16/18 129/88   07/17/18 134/81    Weight from Last 3 Encounters:   08/17/18 52.9 kg (116 lb 9.6 oz)   07/17/18 53.8 kg (118 lb 9.6 oz)   01/08/18 56.4 kg (124 lb 6.4 oz)              Today, you had the following     No orders found for display       Primary Care Provider Office Phone # Fax #    Elliot Nguyễn -[phone]       [id number] [place]  [facility] [address]        Goals        General    Psychosocial I want to understand my denial for STD and appeal (pt-stated)     Notes - Note edited  2/2/2016  1:05 PM by Thuy Muñiz LSW    As of today's date 2/2/2016 goal is met at 26 - 50%.   Goal Status:  Showing progress Received assistance with appeal.Waiting,  As of today's date 12/29/2015 goal is met at 0 - 25%.   Goal Status:  Active        Equal Access to Services     [facility] : Hadii marilou lyon hadasho Sobora, waaxda luqadaha, qaybta kaalmaiván ruiz. So [facility] [phone].    ATENCIÓN: Si habla español, tiene a vazquez disposición servicios gratuitos de asistencia lingüística. Llame al [phone].    We comply with applicable federal civil rights laws and [place] laws. We do not discriminate on the basis of race, color, national origin, age, disability, sex, sexual orientation, or gender identity.            Thank you!     Thank you for choosing [facility]" for your care. Our goal is always to provide you with excellent care. Hearing back from our patients is one way we can continue to improve our services. Please take a few minutes to complete the written survey that you may receive in the mail after your visit with us. Thank you!             Your Updated Medication List - Protect others around you: Learn how to safely use, store and throw away your medicines at www.disposemymeds.org.          This list is accurate as of 8/17/18  8:02 AM.  Always use your most recent med list.                   Brand Name Dispense Instructions for use Diagnosis    entecavir 0.5 MG tablet    BARACLUDE    60 tablet    TAKE 2 TABLETS (1 MG) BY MOUTH DAILY    Chronic hepatitis B with cirrhosis (H)

## 2018-08-17 NOTE — PROGRESS NOTES
"  SUBJECTIVE:   Judah Diaz is a 58 year old male who presents to clinic today for the following health issues:  Over 50% of this 25-minute visit is spent face-to-face with the patient, counseling him on interpretation of EGD results and on my recommended treatment of his various problems and coordination of his care, as summarized below:    Follow up liver    Patient would like to discuss results from recent GI procedure (8/16/18).     The patient would also like to know what kind of foods to avoid as to not affect his liver function.      Amount of exercise or physical activity: 2-3 days/week for an average of greater than 60 minutes    Problems taking medications regularly: no    Medication side effects: unsure    Diet: regular (no restrictions)      Past medical, family, and social histories, medications, and allergies are reviewed and updated in [facility].     ROS:  CONSTITUTIONAL: NEGATIVE for fever, chills, change in weight  ENT/MOUTH: NEGATIVE for ear, mouth and throat problems  RESP: NEGATIVE for significant cough or SOB  CV: NEGATIVE for chest pain, palpitations or peripheral edema  ROS otherwise negative    This document serves as a record of the services and decisions personally performed and made by Dr. Nguyễn. It was created on his behalf by Jen Taylor, a trained medical scribe. The creation of this document is based the provider's statements to the medical scribe.  Jen Taylor August 17, 2018 7:35 AM     OBJECTIVE:                                                    /76 (BP Location: Left arm, Patient Position: Chair, Cuff Size: Adult Regular)  Pulse 60  Temp 98.9  F (37.2  C) (Oral)  Resp 16  Ht 1.702 m (5' 7\")  Wt 52.9 kg (116 lb 9.6 oz)  SpO2 100%  BMI 18.26 kg/m2   Body mass index is 18.26 kg/(m^2).     GENERAL: healthy, alert and no distress  EYES: Eyes grossly normal to inspection, PERRL, EOMI, sclerae white and conjunctivae normal  MS: no gross musculoskeletal defects " noted, no edema  SKIN: no suspicious lesions or rashes  NEURO: Normal strength and tone, sensory exam grossly normal, mentation intact, oriented times 3 and cranial nerves 2-12 intact  PSYCH: mentation appears normal, affect normal/bright     Diagnostic Test Results:  Results for orders placed or performed during the hospital encounter of 08/16/18   UPPER GI ENDOSCOPY   Result Value Ref Range    Upper GI Endoscopy       [facility], [address] [place] [address] [phone]     Endoscopy Department  _______________________________________________________________________________  Patient Name: Judah Diaz Procedure Date: 8/16/2018 7:02 AM  MRN: [id number]                       Account Number: [id number]  YOB: 1960              Admit Type: Outpatient  Age: 58                                Gender: Male  Note Status: Finalized                Attending MD: Julio Young MD  Total Sedation Time:                    _______________________________________________________________________________     Procedure:           Upper GI endoscopy  Indications:         Follow-up of esophageal varices  Providers:           Julio Young MD, Nicole Najera RN  Referring MD:        Elliot Nguyễn MD  Medicines:           Midazolam 2 mg IV, Fentanyl 100 micrograms IV  Complications:       No immediate complications.  _________________________________________________ ______________________________  Procedure:           Pre-Anesthesia Assessment:                       - Prior to the procedure, a History and Physical was                        performed, and patient medications and allergies were                        reviewed. The patient is competent. The risks and                        benefits of the procedure and the sedation options and                        risks were discussed with the patient. All questions                        were answered and informed consent was  obtained. Patient                        identification and proposed procedure were verified by                        the physician and the nurse in the pre-procedure area in                        the procedure room. Mental Status Examination: alert and                        oriented. Airway Examination: normal oropharyngeal                        airway and neck mobility. Respiratory Examination: clear                        to auscultation. CV Examination: normal. Proph ylactic                        Antibiotics: The patient does not require prophylactic                        antibiotics. Prior Anticoagulants: The patient has taken                        no previous anticoagulant or antiplatelet agents. ASA                        Grade Assessment: II - A patient with mild systemic                        disease. After reviewing the risks and benefits, the                        patient was deemed in satisfactory condition to undergo                        the procedure. The anesthesia plan was to use moderate                        sedation / analgesia (conscious sedation). Immediately                        prior to administration of medications, the patient was                        re-assessed for adequacy to receive sedatives. The heart                        rate, respiratory rate, oxygen saturations, blood                        pressure, adequacy of pulmonary ventilation, and                        response to care were monitored throughout t he                        procedure. The physical status of the patient was                        re-assessed after the procedure.                       After obtaining informed consent, the endoscope was                        passed under direct vision. Throughout the procedure,                        the patient's blood pressure, pulse, and oxygen                        saturations were monitored continuously. The Endoscope                        was  introduced through the mouth, and advanced to the                        second part of duodenum. The upper GI endoscopy was                        accomplished without difficulty. The patient tolerated                        the procedure well.                                                                                   Findings:       Non-bleeding grade I varices were found in the lower third of the        esophagus,. They were diminutive in size. No stigmata of recent bleeding        were evident and no red sultana signs were pres ent.       Moderate portal hypertensive gastropathy was found in the gastric fundus.       The exam of the stomach was otherwise normal.       The examined duodenum was normal.                                                                                   Moderate Sedation:       Moderate (conscious) sedation was administered by the endoscopy nurse        and supervised by the endoscopist. The following parameters were        monitored: oxygen saturation, heart rate, blood pressure, and response        to care. Total physician intraservice time was 8 minutes.  Impression:          - Non-bleeding grade I esophageal varices.                       - Portal hypertensive gastropathy.                       - Normal examined duodenum.                       - No specimens collected.  Recommendation:      - Discharge patient to home.                       - Resume previous diet.                       - Continue present medications.                       - Repeat upper endoscopy in 2 years fo r surveillance.                       - Return to my office as previously scheduled.                                                                                     ________________  Julio Young MD  8/16/2018 8:23:48 AM  I was physically present for the entire viewing portion of the exam.  __________________________  Signature of teaching physician  Hermann/[id number] Young MD  Number of Addenda:  0    Note Initiated On: 8/16/2018 7:02 AM  Scope In:  Scope Out:          ASSESSMENT/PLAN:                                                      (K76.6) Portal hypertension (H)  (primary encounter diagnosis)  (B18.1,  K74.60) Chronic hepatitis B with cirrhosis (H)  (K74.60) Cirrhosis of liver with ascites, unspecified hepatic cirrhosis type (H)  Comment: EGD report reviewed with patient. His esophageal varices are stable from last EGD.  Plan: next EGD due in 2 years. follow up with liver specialist in January. We discussed details of low sodium diet. Handout and DASH diet provided. UpToDate handouts provided (cirrhosis beyond the basics).    Follow up in 1 year.      The information in this document, created by the medical scribe for me, accurately reflects the services I personally performed and the decisions made by me. I have reviewed and approved this document for accuracy prior to leaving the patient care area. August 17, 2018 7:35 AM     Elliot Nguyễn MD

## 2018-08-27 DIAGNOSIS — K74.60 CHRONIC HEPATITIS B WITH CIRRHOSIS (H): ICD-10-CM

## 2018-08-27 DIAGNOSIS — B18.1 CHRONIC HEPATITIS B WITH CIRRHOSIS (H): ICD-10-CM

## 2018-08-27 RX ORDER — ENTECAVIR 0.5 MG/1
TABLET, FILM COATED ORAL
Qty: 180 TABLET | Refills: 3 | Status: SHIPPED | OUTPATIENT
Start: 2018-08-27 | End: 2019-07-22

## 2019-01-03 DIAGNOSIS — B18.1 CHRONIC HEPATITIS B WITH CIRRHOSIS (H): Primary | ICD-10-CM

## 2019-01-03 DIAGNOSIS — K74.60 CHRONIC HEPATITIS B WITH CIRRHOSIS (H): Primary | ICD-10-CM

## 2019-01-15 ENCOUNTER — OFFICE VISIT (OUTPATIENT)
Dept: GASTROENTEROLOGY | Facility: CLINIC | Age: 59
End: 2019-01-15
Attending: INTERNAL MEDICINE
Payer: COMMERCIAL

## 2019-01-15 VITALS
OXYGEN SATURATION: 100 % | HEIGHT: 67 IN | WEIGHT: 121.8 LBS | SYSTOLIC BLOOD PRESSURE: 148 MMHG | HEART RATE: 51 BPM | BODY MASS INDEX: 19.12 KG/M2 | DIASTOLIC BLOOD PRESSURE: 89 MMHG | TEMPERATURE: 97.6 F | RESPIRATION RATE: 16 BRPM

## 2019-01-15 DIAGNOSIS — K74.60 CHRONIC HEPATITIS B WITH CIRRHOSIS (H): ICD-10-CM

## 2019-01-15 DIAGNOSIS — B18.1 CHRONIC HEPATITIS B WITH CIRRHOSIS (H): ICD-10-CM

## 2019-01-15 DIAGNOSIS — K74.60 CIRRHOSIS OF LIVER WITHOUT ASCITES, UNSPECIFIED HEPATIC CIRRHOSIS TYPE (H): ICD-10-CM

## 2019-01-15 DIAGNOSIS — Z23 NEED FOR PROPHYLACTIC VACCINATION AND INOCULATION AGAINST INFLUENZA: Primary | ICD-10-CM

## 2019-01-15 LAB
ALBUMIN SERPL-MCNC: 3.7 G/DL (ref 3.4–5)
ALP SERPL-CCNC: 62 U/L (ref 40–150)
ALT SERPL W P-5'-P-CCNC: 25 U/L (ref 0–70)
ANION GAP SERPL CALCULATED.3IONS-SCNC: 3 MMOL/L (ref 3–14)
AST SERPL W P-5'-P-CCNC: 20 U/L (ref 0–45)
BILIRUB DIRECT SERPL-MCNC: 0.2 MG/DL (ref 0–0.2)
BILIRUB SERPL-MCNC: 0.7 MG/DL (ref 0.2–1.3)
BUN SERPL-MCNC: 16 MG/DL (ref 7–30)
CALCIUM SERPL-MCNC: 8.3 MG/DL (ref 8.5–10.1)
CHLORIDE SERPL-SCNC: 106 MMOL/L (ref 94–109)
CO2 SERPL-SCNC: 31 MMOL/L (ref 20–32)
CREAT SERPL-MCNC: 0.88 MG/DL (ref 0.66–1.25)
ERYTHROCYTE [DISTWIDTH] IN BLOOD BY AUTOMATED COUNT: 12.2 % (ref 10–15)
GFR SERPL CREATININE-BSD FRML MDRD: >90 ML/MIN/{1.73_M2}
GLUCOSE SERPL-MCNC: 98 MG/DL (ref 70–99)
HCT VFR BLD AUTO: 46.6 % (ref 40–53)
HGB BLD-MCNC: 15.4 G/DL (ref 13.3–17.7)
INR PPP: 0.98 (ref 0.86–1.14)
MCH RBC QN AUTO: 29.4 PG (ref 26.5–33)
MCHC RBC AUTO-ENTMCNC: 33 G/DL (ref 31.5–36.5)
MCV RBC AUTO: 89 FL (ref 78–100)
PLATELET # BLD AUTO: 97 10E9/L (ref 150–450)
POTASSIUM SERPL-SCNC: 4.1 MMOL/L (ref 3.4–5.3)
PROT SERPL-MCNC: 7.2 G/DL (ref 6.8–8.8)
RBC # BLD AUTO: 5.24 10E12/L (ref 4.4–5.9)
SODIUM SERPL-SCNC: 139 MMOL/L (ref 133–144)
WBC # BLD AUTO: 2.9 10E9/L (ref 4–11)

## 2019-01-15 PROCEDURE — 25000128 H RX IP 250 OP 636: Mod: ZF | Performed by: INTERNAL MEDICINE

## 2019-01-15 PROCEDURE — G0008 ADMIN INFLUENZA VIRUS VAC: HCPCS | Mod: ZF

## 2019-01-15 PROCEDURE — 85610 PROTHROMBIN TIME: CPT | Performed by: INTERNAL MEDICINE

## 2019-01-15 PROCEDURE — 87517 HEPATITIS B DNA QUANT: CPT | Performed by: INTERNAL MEDICINE

## 2019-01-15 PROCEDURE — 90686 IIV4 VACC NO PRSV 0.5 ML IM: CPT | Mod: ZF | Performed by: INTERNAL MEDICINE

## 2019-01-15 PROCEDURE — 36415 COLL VENOUS BLD VENIPUNCTURE: CPT | Performed by: INTERNAL MEDICINE

## 2019-01-15 PROCEDURE — 80048 BASIC METABOLIC PNL TOTAL CA: CPT | Performed by: INTERNAL MEDICINE

## 2019-01-15 PROCEDURE — 85027 COMPLETE CBC AUTOMATED: CPT | Performed by: INTERNAL MEDICINE

## 2019-01-15 PROCEDURE — 80076 HEPATIC FUNCTION PANEL: CPT | Performed by: INTERNAL MEDICINE

## 2019-01-15 PROCEDURE — G0463 HOSPITAL OUTPT CLINIC VISIT: HCPCS | Mod: 25,ZF

## 2019-01-15 RX ADMIN — INFLUENZA A VIRUS A/MICHIGAN/45/2015 X-275 (H1N1) ANTIGEN (FORMALDEHYDE INACTIVATED), INFLUENZA A VIRUS A/SINGAPORE/INFIMH-16-0019/2016 IVR-186 (H3N2) ANTIGEN (FORMALDEHYDE INACTIVATED), INFLUENZA B VIRUS B/PHUKET/3073/2013 ANTIGEN (FORMALDEHYDE INACTIVATED), AND INFLUENZA B VIRUS B/MARYLAND/15/2016 BX-69A ANTIGEN (FORMALDEHYDE INACTIVATED) 0.5 ML: 15; 15; 15; 15 INJECTION, SUSPENSION INTRAMUSCULAR at 09:35

## 2019-01-15 ASSESSMENT — PAIN SCALES - GENERAL: PAINLEVEL: NO PAIN (0)

## 2019-01-15 ASSESSMENT — MIFFLIN-ST. JEOR: SCORE: 1326.11

## 2019-01-15 NOTE — NURSING NOTE
"Chief Complaint   Patient presents with     RECHECK     Hep B cirrhosis       Vital signs:  Temp: 97.6  F (36.4  C) Temp src: Oral BP: 148/89 Pulse: 51   Resp: 16 SpO2: 100 %     Height: 170.2 cm (5' 7\") Weight: 55.2 kg (121 lb 12.8 oz)  Estimated body mass index is 19.08 kg/m  as calculated from the following:    Height as of this encounter: 1.702 m (5' 7\").    Weight as of this encounter: 55.2 kg (121 lb 12.8 oz).          Nicole Mars [facility]  1/15/2019 8:27 AM      "

## 2019-01-15 NOTE — PROGRESS NOTES
[facility]    Hepatology follow-up    Follow-up visit for cirrhosis    Subjective:  58 year old male    HBV  - dx 2015  - GT-C  - eAg negative, eAb positive  - on entecavir since 2015  - HBV DNA undetectable, 7/15/18      Cirrhosis  - dx 2015  - HBV  - hx ascites, resolved with antiviral therapy  - no hx variceal bleed or HE  - EGD Aug 2018- small EV, moderate portal hypertensive gastropathy  - HCC screening- abd U/S Aug 2018    Patient comes to clinic this morning with his son for follow-up of HBV cirrhosis.  Last clinic visit July 2018.  Upper endoscopy in Aug 2018 showed small esophageal varices (stable ) and moderate portal hypertensive gastropathy.  Patient denies new medications, ER visits or hospital admissions since last clinic visit.    Patient is well today.  He would like to taper down his entecavir if possible.  He denies any symptoms specific to liver disease.    Patient denies jaundice, lower extremity edema, abdominal distension, lethargy or confusion.    Patient denies melena, hematemesis or hematochezia.    Patient denies fevers, sweats or chills.  He has not yet had an influenza vaccination this season.    Weight stable.  Appetite is good.    Patient does not drink alcohol.  He continues to work for [employer].      Medical hx Surgical hx   Past Medical History:   Diagnosis Date     Chronic hepatitis B (H)      Cirrhosis (H)      Perforation of tympanic membrane, unspecified      Pupil irregular of right eye 2012      Past Surgical History:   Procedure Laterality Date     ESOPHAGOSCOPY, GASTROSCOPY, DUODENOSCOPY (EGD), COMBINED N/A 1/6/2016    Procedure: COMBINED ESOPHAGOSCOPY, GASTROSCOPY, DUODENOSCOPY (EGD);  Surgeon: Jordan Wilson MD;  Location:  GI     ESOPHAGOSCOPY, GASTROSCOPY, DUODENOSCOPY (EGD), COMBINED N/A 8/16/2018    Procedure: COMBINED ESOPHAGOSCOPY, GASTROSCOPY, DUODENOSCOPY (EGD);  EGD;  Surgeon: Julio Barajas MD;  Location:   "GI     NO HISTORY OF SURGERY      that he can recall          Medications  Prior to Admission medications    Medication Sig Start Date End Date Taking? Authorizing Provider   entecavir (BARACLUDE) 0.5 MG tablet TAKE 2 TABLETS (1 MG) BY MOUTH DAILY 8/27/18  Yes Julio Barajas MD       Allergies  No Known Allergies    Review of systems  A 10-point review of systems was negative    Examination  /89 (BP Location: Right arm, Patient Position: Sitting, Cuff Size: Adult Regular)   Pulse 51   Temp 97.6  F (36.4  C) (Oral)   Resp 16   Ht 1.702 m (5' 7\")   Wt 55.2 kg (121 lb 12.8 oz)   SpO2 100%   BMI 19.08 kg/m    Body mass index is 19.08 kg/m .    Gen- well, NAD, A+Ox3, normal color  CVS- RRR  RS- CTA  Abd- SNT, no ascites or organomegaly on palpation or percussion, BS+  Extr- hands normal, no YASMIN  Skin- no rash or jaundice  Neuro- no asterixis  Psych- normal mood    Laboratory  Lab Results   Component Value Date     01/15/2019    POTASSIUM 4.1 01/15/2019    CHLORIDE 106 01/15/2019    CO2 31 01/15/2019    BUN 16 01/15/2019    CR 0.88 01/15/2019       Lab Results   Component Value Date    BILITOTAL 0.7 01/15/2019    ALT 25 01/15/2019    AST 20 01/15/2019    ALKPHOS 62 01/15/2019       Lab Results   Component Value Date    ALBUMIN 3.7 01/15/2019    PROTTOTAL 7.2 01/15/2019        Lab Results   Component Value Date    WBC 2.9 01/15/2019    HGB 15.4 01/15/2019    MCV 89 01/15/2019    PLT 97 01/15/2019       Lab Results   Component Value Date    INR 0.98 01/15/2019       Radiology  EGD Aug 2018 personally reviewed    Assessment  58 year old male who presents for routine follow-up of compensated HBV cirrhosis.  MELD-Na= 6.  No evidence of ascites or hepatic encephalopathy.  Liver function tests normal on antiviral therapy- will not be tapering entecavir.  Up to date with surveillance of esophageal varices.  Due for HCC screening.    Plan  1.  Continue entecavir 1mg PO Q24  2.  Abd U/S  3.  Influenza " vaccination  4.  Follow-up in 6 months    Julio Young MD  Hepatology  [facility]

## 2019-01-15 NOTE — LETTER
1/15/2019      RE: Judah Diaz  [address]  [place] [address]       [facility]    Hepatology follow-up    Follow-up visit for cirrhosis    Subjective:  58 year old male    HBV  - dx 2015  - GT-C  - eAg negative, eAb positive  - on entecavir since 2015  - HBV DNA undetectable, 7/15/18      Cirrhosis  - dx 2015  - HBV  - hx ascites, resolved with antiviral therapy  - no hx variceal bleed or HE  - EGD Aug 2018- small EV, moderate portal hypertensive gastropathy  - HCC screening- abd U/S Aug 2018    Patient comes to clinic this morning with his son for follow-up of HBV cirrhosis.  Last clinic visit July 2018.  Upper endoscopy in Aug 2018 showed small esophageal varices (stable ) and moderate portal hypertensive gastropathy.  Patient denies new medications, ER visits or hospital admissions since last clinic visit.    Patient is well today.  He would like to taper down his entecavir if possible.  He denies any symptoms specific to liver disease.    Patient denies jaundice, lower extremity edema, abdominal distension, lethargy or confusion.    Patient denies melena, hematemesis or hematochezia.    Patient denies fevers, sweats or chills.  He has not yet had an influenza vaccination this season.    Weight stable.  Appetite is good.    Patient does not drink alcohol.  He continues to work for [employer].      Medical hx Surgical hx   Past Medical History:   Diagnosis Date     Chronic hepatitis B (H)      Cirrhosis (H)      Perforation of tympanic membrane, unspecified      Pupil irregular of right eye 2012      Past Surgical History:   Procedure Laterality Date     ESOPHAGOSCOPY, GASTROSCOPY, DUODENOSCOPY (EGD), COMBINED N/A 1/6/2016    Procedure: COMBINED ESOPHAGOSCOPY, GASTROSCOPY, DUODENOSCOPY (EGD);  Surgeon: Jordan Wilsno MD;  Location:  GI     ESOPHAGOSCOPY, GASTROSCOPY, DUODENOSCOPY (EGD), COMBINED N/A 8/16/2018    Procedure: COMBINED ESOPHAGOSCOPY,  "GASTROSCOPY, DUODENOSCOPY (EGD);  EGD;  Surgeon: Julio Barajas MD;  Location: [facility] GI     NO HISTORY OF SURGERY      that he can recall          Medications  Prior to Admission medications    Medication Sig Start Date End Date Taking? Authorizing Provider   entecavir (BARACLUDE) 0.5 MG tablet TAKE 2 TABLETS (1 MG) BY MOUTH DAILY 8/27/18  Yes Julio Barajas MD       Allergies  No Known Allergies    Examination  /89 (BP Location: Right arm, Patient Position: Sitting, Cuff Size: Adult Regular)   Pulse 51   Temp 97.6  F (36.4  C) (Oral)   Resp 16   Ht 1.702 m (5' 7\")   Wt 55.2 kg (121 lb 12.8 oz)   SpO2 100%   BMI 19.08 kg/m     Body mass index is 19.08 kg/m .    Gen- well, NAD, A+Ox3, normal color  CVS- RRR  RS- CTA  Abd- SNT, no ascites or organomegaly on palpation or percussion, BS+  Extr- hands normal, no YASMIN  Skin- no rash or jaundice  Neuro- no asterixis  Psych- normal mood    Laboratory  Lab Results   Component Value Date     01/15/2019    POTASSIUM 4.1 01/15/2019    CHLORIDE 106 01/15/2019    CO2 31 01/15/2019    BUN 16 01/15/2019    CR 0.88 01/15/2019       Lab Results   Component Value Date    BILITOTAL 0.7 01/15/2019    ALT 25 01/15/2019    AST 20 01/15/2019    ALKPHOS 62 01/15/2019       Lab Results   Component Value Date    ALBUMIN 3.7 01/15/2019    PROTTOTAL 7.2 01/15/2019        Lab Results   Component Value Date    WBC 2.9 01/15/2019    HGB 15.4 01/15/2019    MCV 89 01/15/2019    PLT 97 01/15/2019       Lab Results   Component Value Date    INR 0.98 01/15/2019       Radiology  EGD Aug 2018 personally reviewed    Assessment  58 year old male who presents for routine follow-up of compensated HBV cirrhosis.  MELD-Na= 6.  No evidence of ascites or hepatic encephalopathy.  Liver function tests normal on antiviral therapy- will not be tapering entecavir.  Up to date with surveillance of esophageal varices.  Due for HCC screening.    Plan  1.  Continue entecavir 1mg PO " Q24  2.  Abd U/S  3.  Influenza vaccination  4.  Follow-up in 6 months    Julio Young MD  Hepatology  [facility]

## 2019-01-15 NOTE — LETTER
1/15/2019       RE: Judah Diaz  [address]  [place] [address]     Dear Colleague,    Thank you for referring your patient, Judah Diaz, to the [facility] HEPATOLOGY at [facility]. Please see a copy of my visit note below.    [facility]    Hepatology follow-up    Follow-up visit for cirrhosis    Subjective:  58 year old male    HBV  - dx 2015  - GT-C  - eAg negative, eAb positive  - on entecavir since 2015  - HBV DNA undetectable, 7/15/18      Cirrhosis  - dx 2015  - HBV  - hx ascites, resolved with antiviral therapy  - no hx variceal bleed or HE  - EGD Aug 2018- small EV, moderate portal hypertensive gastropathy  - HCC screening- abd U/S Aug 2018    Patient comes to clinic this morning with his son for follow-up of HBV cirrhosis.  Last clinic visit July 2018.  Upper endoscopy in Aug 2018 showed small esophageal varices (stable ) and moderate portal hypertensive gastropathy.  Patient denies new medications, ER visits or hospital admissions since last clinic visit.    Patient is well today.  He would like to taper down his entecavir if possible.  He denies any symptoms specific to liver disease.    Patient denies jaundice, lower extremity edema, abdominal distension, lethargy or confusion.    Patient denies melena, hematemesis or hematochezia.    Patient denies fevers, sweats or chills.  He has not yet had an influenza vaccination this season.    Weight stable.  Appetite is good.    Patient does not drink alcohol.  He continues to work for [employer].      Medical hx Surgical hx   Past Medical History:   Diagnosis Date     Chronic hepatitis B (H)      Cirrhosis (H)      Perforation of tympanic membrane, unspecified      Pupil irregular of right eye 2012      Past Surgical History:   Procedure Laterality Date     ESOPHAGOSCOPY, GASTROSCOPY, DUODENOSCOPY (EGD), COMBINED N/A 1/6/2016    Procedure: COMBINED  "ESOPHAGOSCOPY, GASTROSCOPY, DUODENOSCOPY (EGD);  Surgeon: Jordan Wilson MD;  Location: [facility] GI     ESOPHAGOSCOPY, GASTROSCOPY, DUODENOSCOPY (EGD), COMBINED N/A 8/16/2018    Procedure: COMBINED ESOPHAGOSCOPY, GASTROSCOPY, DUODENOSCOPY (EGD);  EGD;  Surgeon: Julio Barajas MD;  Location:  GI     NO HISTORY OF SURGERY      that he can recall          Medications  Prior to Admission medications    Medication Sig Start Date End Date Taking? Authorizing Provider   entecavir (BARACLUDE) 0.5 MG tablet TAKE 2 TABLETS (1 MG) BY MOUTH DAILY 8/27/18  Yes Julio Barajas MD     Allergies  No Known Allergies    Examination  /89 (BP Location: Right arm, Patient Position: Sitting, Cuff Size: Adult Regular)   Pulse 51   Temp 97.6  F (36.4  C) (Oral)   Resp 16   Ht 1.702 m (5' 7\")   Wt 55.2 kg (121 lb 12.8 oz)   SpO2 100%   BMI 19.08 kg/m     Body mass index is 19.08 kg/m .    Gen- well, NAD, A+Ox3, normal color  CVS- RRR  RS- CTA  Abd- SNT, no ascites or organomegaly on palpation or percussion, BS+  Extr- hands normal, no YASMIN  Skin- no rash or jaundice  Neuro- no asterixis  Psych- normal mood    Laboratory  Lab Results   Component Value Date     01/15/2019    POTASSIUM 4.1 01/15/2019    CHLORIDE 106 01/15/2019    CO2 31 01/15/2019    BUN 16 01/15/2019    CR 0.88 01/15/2019       Lab Results   Component Value Date    BILITOTAL 0.7 01/15/2019    ALT 25 01/15/2019    AST 20 01/15/2019    ALKPHOS 62 01/15/2019       Lab Results   Component Value Date    ALBUMIN 3.7 01/15/2019    PROTTOTAL 7.2 01/15/2019        Lab Results   Component Value Date    WBC 2.9 01/15/2019    HGB 15.4 01/15/2019    MCV 89 01/15/2019    PLT 97 01/15/2019       Lab Results   Component Value Date    INR 0.98 01/15/2019       Radiology  EGD Aug 2018 personally reviewed    Assessment  58 year old male who presents for routine follow-up of compensated HBV cirrhosis.  MELD-Na= 6.  No evidence of ascites or hepatic " encephalopathy.  Liver function tests normal on antiviral therapy- will not be tapering entecavir.  Up to date with surveillance of esophageal varices.  Due for HCC screening.    Plan  1.  Continue entecavir 1mg PO Q24  2.  Abd U/S  3.  Influenza vaccination  4.  Follow-up in 6 months    Julio Young MD  Hepatology  [facility]

## 2019-01-15 NOTE — NURSING NOTE
"Injectable Influenza Immunization Documentation    1.  Has the patient received the information for the injectable influenza vaccine? YES     2. Is the patient 6 months of age or older? YES     3. Does the patient have any of the following contraindications?         Severe allergy to eggs? No     Severe allergic reaction to previous influenza vaccines? No   Severe allergy to latex? No       History of Guillain-Jennings syndrome? No     Currently have a temperature greater than 100.4F? No        4.  Severely egg allergic patients should have flu vaccine eligibility assessed by an MD, RN, or pharmacist, and those who received flu vaccine should be observed for 15 min by an MD, RN, Pharmacist, Medical Technician, or member of clinic staff.\": YES    5. Latex-allergic patients should be given latex-free influenza vaccine Yes. Please reference the Vaccine latex table to determine if your clinic s product is latex-containing.       Vaccination given by Jessi Veras CMA          "

## 2019-01-18 LAB
HBV DNA SERPL NAA+PROBE-ACNC: NORMAL [IU]/ML
HBV DNA SERPL NAA+PROBE-LOG IU: NORMAL {LOG_IU}/ML

## 2019-01-24 ENCOUNTER — NURSE TRIAGE (OUTPATIENT)
Dept: NURSING | Facility: CLINIC | Age: 59
End: 2019-01-24

## 2019-01-25 ENCOUNTER — ANCILLARY PROCEDURE (OUTPATIENT)
Dept: ULTRASOUND IMAGING | Facility: CLINIC | Age: 59
End: 2019-01-25
Payer: COMMERCIAL

## 2019-01-25 DIAGNOSIS — K74.60 CIRRHOSIS OF LIVER WITHOUT ASCITES, UNSPECIFIED HEPATIC CIRRHOSIS TYPE (H): ICD-10-CM

## 2019-01-25 PROCEDURE — 76700 US EXAM ABDOM COMPLETE: CPT

## 2019-01-25 NOTE — TELEPHONE ENCOUNTER
Caller verifying US instructions for his appointment in the morning.     RN advised to call back with any changes, worsening of symptoms, and questions or concerns.   Anaid Miles RN/FNA    Additional Information    [1] Follow-up call to recent contact AND [2] information only call, no triage required    Protocols used: INFORMATION ONLY CALL-ADULT-

## 2019-06-21 DIAGNOSIS — B18.1 CHRONIC HEPATITIS B WITH CIRRHOSIS (H): Primary | ICD-10-CM

## 2019-06-21 DIAGNOSIS — K74.60 CHRONIC HEPATITIS B WITH CIRRHOSIS (H): Primary | ICD-10-CM

## 2019-07-22 ENCOUNTER — ANCILLARY PROCEDURE (OUTPATIENT)
Dept: ULTRASOUND IMAGING | Facility: CLINIC | Age: 59
End: 2019-07-22
Payer: COMMERCIAL

## 2019-07-22 ENCOUNTER — OFFICE VISIT (OUTPATIENT)
Dept: GASTROENTEROLOGY | Facility: CLINIC | Age: 59
End: 2019-07-22
Attending: INTERNAL MEDICINE
Payer: COMMERCIAL

## 2019-07-22 VITALS
OXYGEN SATURATION: 100 % | SYSTOLIC BLOOD PRESSURE: 163 MMHG | WEIGHT: 122.4 LBS | DIASTOLIC BLOOD PRESSURE: 89 MMHG | BODY MASS INDEX: 19.21 KG/M2 | HEIGHT: 67 IN | TEMPERATURE: 97.6 F | HEART RATE: 47 BPM

## 2019-07-22 DIAGNOSIS — Z86.0100 HISTORY OF COLONIC POLYPS: ICD-10-CM

## 2019-07-22 DIAGNOSIS — K74.60 CHRONIC HEPATITIS B WITH CIRRHOSIS (H): Primary | ICD-10-CM

## 2019-07-22 DIAGNOSIS — B18.1 CHRONIC HEPATITIS B WITH CIRRHOSIS (H): ICD-10-CM

## 2019-07-22 DIAGNOSIS — K74.60 CHRONIC HEPATITIS B WITH CIRRHOSIS (H): ICD-10-CM

## 2019-07-22 DIAGNOSIS — B18.1 CHRONIC HEPATITIS B WITH CIRRHOSIS (H): Primary | ICD-10-CM

## 2019-07-22 DIAGNOSIS — K74.60 CIRRHOSIS OF LIVER WITHOUT ASCITES, UNSPECIFIED HEPATIC CIRRHOSIS TYPE (H): ICD-10-CM

## 2019-07-22 LAB
ALBUMIN SERPL-MCNC: 3.8 G/DL (ref 3.4–5)
ALP SERPL-CCNC: 64 U/L (ref 40–150)
ALT SERPL W P-5'-P-CCNC: 30 U/L (ref 0–70)
ANION GAP SERPL CALCULATED.3IONS-SCNC: 3 MMOL/L (ref 3–14)
AST SERPL W P-5'-P-CCNC: 20 U/L (ref 0–45)
BILIRUB DIRECT SERPL-MCNC: 0.2 MG/DL (ref 0–0.2)
BILIRUB SERPL-MCNC: 1.1 MG/DL (ref 0.2–1.3)
BUN SERPL-MCNC: 21 MG/DL (ref 7–30)
CALCIUM SERPL-MCNC: 8.4 MG/DL (ref 8.5–10.1)
CHLORIDE SERPL-SCNC: 107 MMOL/L (ref 94–109)
CO2 SERPL-SCNC: 30 MMOL/L (ref 20–32)
CREAT SERPL-MCNC: 0.96 MG/DL (ref 0.66–1.25)
ERYTHROCYTE [DISTWIDTH] IN BLOOD BY AUTOMATED COUNT: 12.6 % (ref 10–15)
GFR SERPL CREATININE-BSD FRML MDRD: 86 ML/MIN/{1.73_M2}
GLUCOSE SERPL-MCNC: 87 MG/DL (ref 70–99)
HCT VFR BLD AUTO: 48.1 % (ref 40–53)
HGB BLD-MCNC: 16.3 G/DL (ref 13.3–17.7)
INR PPP: 1.05 (ref 0.86–1.14)
MCH RBC QN AUTO: 30.4 PG (ref 26.5–33)
MCHC RBC AUTO-ENTMCNC: 33.9 G/DL (ref 31.5–36.5)
MCV RBC AUTO: 90 FL (ref 78–100)
PLATELET # BLD AUTO: 100 10E9/L (ref 150–450)
POTASSIUM SERPL-SCNC: 3.8 MMOL/L (ref 3.4–5.3)
PROT SERPL-MCNC: 6.8 G/DL (ref 6.8–8.8)
RBC # BLD AUTO: 5.36 10E12/L (ref 4.4–5.9)
SODIUM SERPL-SCNC: 140 MMOL/L (ref 133–144)
WBC # BLD AUTO: 3 10E9/L (ref 4–11)

## 2019-07-22 PROCEDURE — 87517 HEPATITIS B DNA QUANT: CPT | Performed by: INTERNAL MEDICINE

## 2019-07-22 PROCEDURE — 80076 HEPATIC FUNCTION PANEL: CPT | Performed by: INTERNAL MEDICINE

## 2019-07-22 PROCEDURE — 85027 COMPLETE CBC AUTOMATED: CPT | Performed by: INTERNAL MEDICINE

## 2019-07-22 PROCEDURE — G0463 HOSPITAL OUTPT CLINIC VISIT: HCPCS | Mod: ZF

## 2019-07-22 PROCEDURE — 80048 BASIC METABOLIC PNL TOTAL CA: CPT | Performed by: INTERNAL MEDICINE

## 2019-07-22 PROCEDURE — 85610 PROTHROMBIN TIME: CPT | Performed by: INTERNAL MEDICINE

## 2019-07-22 PROCEDURE — 36415 COLL VENOUS BLD VENIPUNCTURE: CPT | Performed by: INTERNAL MEDICINE

## 2019-07-22 RX ORDER — ENTECAVIR 0.5 MG/1
TABLET, FILM COATED ORAL
Qty: 180 TABLET | Refills: 3 | Status: SHIPPED | OUTPATIENT
Start: 2019-07-22 | End: 2020-06-22

## 2019-07-22 ASSESSMENT — PAIN SCALES - GENERAL: PAINLEVEL: NO PAIN (0)

## 2019-07-22 ASSESSMENT — MIFFLIN-ST. JEOR: SCORE: 1323.83

## 2019-07-22 NOTE — LETTER
7/22/2019      RE: Judah Diaz  [address]  [place] [address]       [facility]    Hepatology follow-up    Follow-up visit for cirrhosis    Subjective:  59 year old male    HBV  - dx 2015  - GT-C  - eAg negative, eAb positive  - on entecavir since 2015  - HBV DNA undetectable,  Jan 2019      Cirrhosis  - dx 2015  - HBV  - hx ascites, resolved with antiviral therapy  - no hx variceal bleed or HE  - EGD Aug 2018- small EV, moderate portal hypertensive gastropathy  - HCC screening- abd U/S  7/22/2019    Patient comes to clinic this morning with his son for follow-up of cirrhosis.  Last clinic visit Jan 2019.  Patient denies new medications, ER visits or hospital admissions since last clinic visit.    Patient is well today.  He reports occasional throbbing at his temples when he is working hard.  He continues to have concerns regarding long-term side effects of antiviral therapy for hep B.  He denies any symptoms specific to liver disease.    Patient denies jaundice, lower extremity edema, abdominal distension, lethargy or confusion.    Patient denies melena, hematemesis or hematochezia.    Patient denies fevers, sweats or chills.      Weight stable.  Appetite is good.    Patient does not drink alcohol.  He has been keeping busy this summer by playing tennis [place] local park.      Medical hx Surgical hx   Past Medical History:   Diagnosis Date     Chronic hepatitis B (H)      Cirrhosis (H)      Perforation of tympanic membrane, unspecified     Right, chronic, from remote infection     Pupil irregular of right eye 2012    traumatic, from tennis ball      Past Surgical History:   Procedure Laterality Date     ESOPHAGOSCOPY, GASTROSCOPY, DUODENOSCOPY (EGD), COMBINED N/A 1/6/2016    Procedure: COMBINED ESOPHAGOSCOPY, GASTROSCOPY, DUODENOSCOPY (EGD);  Surgeon: Jordan Wilson MD;  Location:  GI     ESOPHAGOSCOPY, GASTROSCOPY, DUODENOSCOPY (EGD), COMBINED N/A  "8/16/2018    Procedure: COMBINED ESOPHAGOSCOPY, GASTROSCOPY, DUODENOSCOPY (EGD);  EGD;  Surgeon: Julio Barajas MD;  Location:  GI     NO HISTORY OF SURGERY      that he can recall          Medications  Prior to Admission medications    Medication Sig Start Date End Date Taking? Authorizing Provider   entecavir (BARACLUDE) 0.5 MG tablet TAKE 2 TABLETS (1 MG) BY MOUTH DAILY 7/22/19  Yes Julio Barajas MD       Allergies  No Known Allergies    Review of systems  A 10-point review of systems was negative    Examination  /89 (BP Location: Right arm, Patient Position: Sitting, Cuff Size: Adult Regular)   Pulse (!) 47   Temp 97.6  F (36.4  C) (Oral)   Ht 1.702 m (5' 7\")   Wt 55.5 kg (122 lb 6.4 oz)   SpO2 100%   BMI 19.17 kg/m     Body mass index is 19.17 kg/m .    Gen- well, NAD, A+Ox3, normal color  CVS- RRR  RS- CTA  Abd- SNT, no ascites or organomegaly on palpation or percussion, BS+  Extr- hands normal, no YASMIN  Skin- no rash or jaundice  Neuro- no asterixis  Psych- normal mood    Laboratory  Lab Results   Component Value Date     07/22/2019    POTASSIUM 3.8 07/22/2019    CHLORIDE 107 07/22/2019    CO2 30 07/22/2019    BUN 21 07/22/2019    CR 0.96 07/22/2019       Lab Results   Component Value Date    BILITOTAL 1.1 07/22/2019    ALT 30 07/22/2019    AST 20 07/22/2019    ALKPHOS 64 07/22/2019       Lab Results   Component Value Date    ALBUMIN 3.8 07/22/2019    PROTTOTAL 6.8 07/22/2019        Lab Results   Component Value Date    WBC 3.0 07/22/2019    HGB 16.3 07/22/2019    MCV 90 07/22/2019     07/22/2019       Lab Results   Component Value Date    INR 1.05 07/22/2019       Radiology  Colonoscopy Jan 2015 reviewed  Abd U/S 7/22/2019 reviewed    Assessment  59 year old male who presents for routine follow-up of previously decompensated HBV cirrhosis with history of ascites.  MELD-Na= 8 (stabl;e).  No evidence of hepatic encephalopathy or ascites.  Up to date with " surveillance of esophageal varices.  Up to date with HCC screening.  Overdue for colorectal cancer screening therefore will set up colonoscopy with me later this summer.    Plan  1.  Continue entecavir 1mg PO Q24- rx refilled  2.  Colonoscopy with me  3.  Follow-up in 6 months    Julio Young MD  Hepatology  [facility]

## 2019-07-22 NOTE — NURSING NOTE
"Chief Complaint   Patient presents with     RECHECK     HBV cirrhosis     /89 (BP Location: Right arm, Patient Position: Sitting, Cuff Size: Adult Regular)   Pulse 50   Temp 97.6  F (36.4  C) (Oral)   Ht 1.702 m (5' 7\")   Wt 55.5 kg (122 lb 6.4 oz)   SpO2 100%   BMI 19.17 kg/m    Kelly Rodriguez CMA    7/22/2019 8:17 AM      "

## 2019-07-22 NOTE — PROGRESS NOTES
[facility]    Hepatology follow-up    Follow-up visit for cirrhosis    Subjective:  59 year old male    HBV  - dx 2015  - GT-C  - eAg negative, eAb positive  - on entecavir since 2015  - HBV DNA undetectable, Jan 2019      Cirrhosis  - dx 2015  - HBV  - hx ascites, resolved with antiviral therapy  - no hx variceal bleed or HE  - EGD Aug 2018- small EV, moderate portal hypertensive gastropathy  - HCC screening- abd U/S 7/22/2019    Patient comes to clinic this morning with his son for follow-up of cirrhosis.  Last clinic visit Jan 2019.  Patient denies new medications, ER visits or hospital admissions since last clinic visit.    Patient is well today.  He reports occasional throbbing at his temples when he is working hard.  He continues to have concerns regarding long-term side effects of antiviral therapy for hep B.  He denies any symptoms specific to liver disease.    Patient denies jaundice, lower extremity edema, abdominal distension, lethargy or confusion.    Patient denies melena, hematemesis or hematochezia.    Patient denies fevers, sweats or chills.      Weight stable.  Appetite is good.    Patient does not drink alcohol.  He has been keeping busy this summer by playing tennis in his local park.      Medical hx Surgical hx   Past Medical History:   Diagnosis Date     Chronic hepatitis B (H)      Cirrhosis (H)      Perforation of tympanic membrane, unspecified     Right, chronic, from remote infection     Pupil irregular of right eye 2012    traumatic, from tennis ball      Past Surgical History:   Procedure Laterality Date     ESOPHAGOSCOPY, GASTROSCOPY, DUODENOSCOPY (EGD), COMBINED N/A 1/6/2016    Procedure: COMBINED ESOPHAGOSCOPY, GASTROSCOPY, DUODENOSCOPY (EGD);  Surgeon: Jordan Wilson MD;  Location:  GI     ESOPHAGOSCOPY, GASTROSCOPY, DUODENOSCOPY (EGD), COMBINED N/A 8/16/2018    Procedure: COMBINED ESOPHAGOSCOPY, GASTROSCOPY, DUODENOSCOPY (EGD);  EGD;  Surgeon:  "Julio Barajas MD;  Location:  GI     NO HISTORY OF SURGERY      that he can recall          Medications  Prior to Admission medications    Medication Sig Start Date End Date Taking? Authorizing Provider   entecavir (BARACLUDE) 0.5 MG tablet TAKE 2 TABLETS (1 MG) BY MOUTH DAILY 7/22/19  Yes Julio Barajas MD       Allergies  No Known Allergies    Review of systems  A 10-point review of systems was negative    Examination  /89 (BP Location: Right arm, Patient Position: Sitting, Cuff Size: Adult Regular)   Pulse (!) 47   Temp 97.6  F (36.4  C) (Oral)   Ht 1.702 m (5' 7\")   Wt 55.5 kg (122 lb 6.4 oz)   SpO2 100%   BMI 19.17 kg/m    Body mass index is 19.17 kg/m .    Gen- well, NAD, A+Ox3, normal color  CVS- RRR  RS- CTA  Abd- SNT, no ascites or organomegaly on palpation or percussion, BS+  Extr- hands normal, no YASMIN  Skin- no rash or jaundice  Neuro- no asterixis  Psych- normal mood    Laboratory  Lab Results   Component Value Date     07/22/2019    POTASSIUM 3.8 07/22/2019    CHLORIDE 107 07/22/2019    CO2 30 07/22/2019    BUN 21 07/22/2019    CR 0.96 07/22/2019       Lab Results   Component Value Date    BILITOTAL 1.1 07/22/2019    ALT 30 07/22/2019    AST 20 07/22/2019    ALKPHOS 64 07/22/2019       Lab Results   Component Value Date    ALBUMIN 3.8 07/22/2019    PROTTOTAL 6.8 07/22/2019        Lab Results   Component Value Date    WBC 3.0 07/22/2019    HGB 16.3 07/22/2019    MCV 90 07/22/2019     07/22/2019       Lab Results   Component Value Date    INR 1.05 07/22/2019       Radiology  Colonoscopy Jan 2015 reviewed  Abd U/S 7/22/2019 reviewed    Assessment  59 year old male who presents for routine follow-up of previously decompensated HBV cirrhosis with history of ascites.  MELD-Na= 8 (stabl;e).  No evidence of hepatic encephalopathy or ascites.  Up to date with surveillance of esophageal varices.  Up to date with HCC screening.  Overdue for colorectal cancer screening " therefore will set up colonoscopy with me later this summer.    Plan  1.  Continue entecavir 1mg PO Q24- rx refilled  2.  Colonoscopy with me  3.  Follow-up in 6 months    Julio Young MD  Hepatology  [facility]

## 2019-07-24 LAB
HBV DNA SERPL NAA+PROBE-ACNC: NORMAL [IU]/ML
HBV DNA SERPL NAA+PROBE-LOG IU: NORMAL {LOG_IU}/ML

## 2019-10-17 ENCOUNTER — TELEPHONE (OUTPATIENT)
Dept: GASTROENTEROLOGY | Facility: CLINIC | Age: 59
End: 2019-10-17

## 2019-10-17 NOTE — TELEPHONE ENCOUNTER
Patient Name: Judah Diaz   : 1960  MRN: [id number]       : [x] N/A       VM Full - Unable to leave msg - [employer] Msg sent with information needed to complete pre-assessment call.  Request pt contact Endoscopy Pre-assessment RN to complete upcoming procedure information.  This information may be complete by VM if necessary. Telephone call-back number provided.    Tamara Pickard, RN, RN  [facility] Endoscopy    Additional Information regarding appointment:      Patient scheduled for:  [x] Colonoscopy      Indication for procedure. [x] Screening       Sedation Type: [x] Conscious Sedation     Procedure Provider:  [facility]      Referring Provider. Elliot Nguyễn    Arrival time verified: Thurs / [phone]    Facility location verified:   [x][facility] - [address], [address]st Floor, [address]    Pt meets medical necessity for outpatient procedure in hospital Endoscopy Unit:     [x] N/A for this Payor (non-BCBS)      Prep Type:   [x]Golytely eRx: (not sent)    Anticoagulants or blood thinners: [x]None               Electronic implanted devices: [x] No      H&P / Pre op physical completed: [x] N/A    Additional Information: Spoke with wife at home.  She advised me to contact pt via work (cell) phone.  VM full et unable to accept new msgs.     Instructions given:   [x] Resent via [employer]  - This includes Request to contact Pre-Assessment RN et Split-dose Golytely  instructions, Conscious Sedation policy, procedure date/time/location/provider.    _______________________________________________

## 2019-10-21 ENCOUNTER — TELEPHONE (OUTPATIENT)
Dept: GASTROENTEROLOGY | Facility: CLINIC | Age: 59
End: 2019-10-21

## 2019-10-21 DIAGNOSIS — Z12.11 SPECIAL SCREENING FOR MALIGNANT NEOPLASMS, COLON: Primary | ICD-10-CM

## 2019-10-21 RX ORDER — BISACODYL 5 MG/1
TABLET, DELAYED RELEASE ORAL
Qty: 4 TABLET | Refills: 0 | Status: ON HOLD | OUTPATIENT
Start: 2019-10-22 | End: 2019-10-24

## 2019-10-21 NOTE — TELEPHONE ENCOUNTER
Call back     Sent golytely and dulcolax prep to [facility] in [employer], [address], [place]; [phone]        Pre procedure teaching completed: [x] Yes - Reviewed    [x] No questions regarding Sedation as ordered, Transportation from procedure & responsible adult to be with patient following procedure for a minimum of 6 hrs (Conscious Sedation) 24 hrs (MAC): [x] Yes son - confirmed will have post-procedure companionship as required,

## 2019-10-24 ENCOUNTER — HOSPITAL ENCOUNTER (OUTPATIENT)
Facility: CLINIC | Age: 59
Discharge: HOME | End: 2019-10-24
Attending: INTERNAL MEDICINE | Admitting: INTERNAL MEDICINE
Payer: COMMERCIAL

## 2019-10-24 VITALS
DIASTOLIC BLOOD PRESSURE: 73 MMHG | HEART RATE: 48 BPM | OXYGEN SATURATION: 96 % | SYSTOLIC BLOOD PRESSURE: 123 MMHG | RESPIRATION RATE: 10 BRPM

## 2019-10-24 LAB — COLONOSCOPY: NORMAL

## 2019-10-24 PROCEDURE — 88305 TISSUE EXAM BY PATHOLOGIST: CPT | Performed by: INTERNAL MEDICINE

## 2019-10-24 PROCEDURE — 25000132 ZZH RX MED GY IP 250 OP 250 PS 637: Performed by: INTERNAL MEDICINE

## 2019-10-24 PROCEDURE — 99152 MOD SED SAME PHYS/QHP 5/>YRS: CPT | Performed by: INTERNAL MEDICINE

## 2019-10-24 PROCEDURE — 99153 MOD SED SAME PHYS/QHP EA: CPT | Performed by: INTERNAL MEDICINE

## 2019-10-24 PROCEDURE — 25000128 H RX IP 250 OP 636: Performed by: INTERNAL MEDICINE

## 2019-10-24 PROCEDURE — 45380 COLONOSCOPY AND BIOPSY: CPT | Performed by: INTERNAL MEDICINE

## 2019-10-24 PROCEDURE — G0500 MOD SEDAT ENDO SERVICE >5YRS: HCPCS | Performed by: INTERNAL MEDICINE

## 2019-10-24 RX ORDER — ONDANSETRON 2 MG/ML
4 INJECTION INTRAMUSCULAR; INTRAVENOUS
Status: DISCONTINUED | OUTPATIENT
Start: 2019-10-24 | End: 2019-10-24 | Stop reason: HOSPADM

## 2019-10-24 RX ORDER — ONDANSETRON 4 MG/1
4 TABLET, ORALLY DISINTEGRATING ORAL EVERY 6 HOURS PRN
Status: DISCONTINUED | OUTPATIENT
Start: 2019-10-24 | End: 2019-10-24 | Stop reason: HOSPADM

## 2019-10-24 RX ORDER — FLUMAZENIL 0.1 MG/ML
0.2 INJECTION, SOLUTION INTRAVENOUS
Status: DISCONTINUED | OUTPATIENT
Start: 2019-10-24 | End: 2019-10-24 | Stop reason: HOSPADM

## 2019-10-24 RX ORDER — LIDOCAINE 40 MG/G
CREAM TOPICAL
Status: DISCONTINUED | OUTPATIENT
Start: 2019-10-24 | End: 2019-10-24 | Stop reason: HOSPADM

## 2019-10-24 RX ORDER — NALOXONE HYDROCHLORIDE 0.4 MG/ML
.1-.4 INJECTION, SOLUTION INTRAMUSCULAR; INTRAVENOUS; SUBCUTANEOUS
Status: DISCONTINUED | OUTPATIENT
Start: 2019-10-24 | End: 2019-10-24 | Stop reason: HOSPADM

## 2019-10-24 RX ORDER — FENTANYL CITRATE 50 UG/ML
INJECTION, SOLUTION INTRAMUSCULAR; INTRAVENOUS PRN
Status: DISCONTINUED | OUTPATIENT
Start: 2019-10-24 | End: 2019-10-24 | Stop reason: HOSPADM

## 2019-10-24 RX ORDER — SIMETHICONE
LIQUID (ML) MISCELLANEOUS PRN
Status: DISCONTINUED | OUTPATIENT
Start: 2019-10-24 | End: 2019-10-24 | Stop reason: HOSPADM

## 2019-10-24 RX ORDER — ONDANSETRON 2 MG/ML
4 INJECTION INTRAMUSCULAR; INTRAVENOUS EVERY 6 HOURS PRN
Status: DISCONTINUED | OUTPATIENT
Start: 2019-10-24 | End: 2019-10-24 | Stop reason: HOSPADM

## 2019-10-24 NOTE — OR NURSING
Colonoscopy performed, tolerated well. Conscious sedation. Polyps removed.  VSS. On 2LNC throughout procedure. Over to recovery with RN.

## 2019-10-24 NOTE — LETTER
October 25, 2019       TO: Judah Diaz  [address]  [place] [address]       Dear ,    The pathology results returned from the polyp removed at your recent colonoscopy.    The polyp was completely benign with no added risk of colon cancer.      Current guidelines recommend that you undergo a follow-up colonoscopy in 10 years.    Sincerely,            Julio Young MD  [facility] HEPATOLOGY  3rd floor,  [address] [place] [id number]  Tel: [phone]

## 2019-10-25 LAB — COPATH REPORT: NORMAL

## 2019-11-05 ENCOUNTER — HEALTH MAINTENANCE LETTER (OUTPATIENT)
Age: 59
End: 2019-11-05

## 2020-01-06 DIAGNOSIS — B18.1 CHRONIC HEPATITIS B WITH CIRRHOSIS (H): Primary | ICD-10-CM

## 2020-01-06 DIAGNOSIS — K74.60 CHRONIC HEPATITIS B WITH CIRRHOSIS (H): Primary | ICD-10-CM

## 2020-02-17 ENCOUNTER — TELEPHONE (OUTPATIENT)
Dept: GASTROENTEROLOGY | Facility: CLINIC | Age: 60
End: 2020-02-17

## 2020-02-17 NOTE — TELEPHONE ENCOUNTER
Spoke to Michelle and reminded of pt upcoming appointment.  Patient instructed to bring updated medications list to appointment.

## 2020-02-19 ENCOUNTER — ANCILLARY PROCEDURE (OUTPATIENT)
Dept: ULTRASOUND IMAGING | Facility: CLINIC | Age: 60
End: 2020-02-19
Attending: INTERNAL MEDICINE
Payer: COMMERCIAL

## 2020-02-19 ENCOUNTER — OFFICE VISIT (OUTPATIENT)
Dept: GASTROENTEROLOGY | Facility: CLINIC | Age: 60
End: 2020-02-19
Attending: INTERNAL MEDICINE

## 2020-02-19 VITALS
HEIGHT: 67 IN | HEART RATE: 48 BPM | DIASTOLIC BLOOD PRESSURE: 78 MMHG | TEMPERATURE: 97.8 F | SYSTOLIC BLOOD PRESSURE: 115 MMHG | WEIGHT: 128.6 LBS | BODY MASS INDEX: 20.18 KG/M2 | OXYGEN SATURATION: 94 %

## 2020-02-19 DIAGNOSIS — B18.1 CHRONIC HEPATITIS B WITH CIRRHOSIS (H): ICD-10-CM

## 2020-02-19 DIAGNOSIS — K74.60 CHRONIC HEPATITIS B WITH CIRRHOSIS (H): ICD-10-CM

## 2020-02-19 DIAGNOSIS — K74.60 CHRONIC HEPATITIS B WITH CIRRHOSIS (H): Primary | ICD-10-CM

## 2020-02-19 DIAGNOSIS — B18.1 CHRONIC HEPATITIS B WITH CIRRHOSIS (H): Primary | ICD-10-CM

## 2020-02-19 LAB
ALBUMIN SERPL-MCNC: 4.1 G/DL (ref 3.4–5)
ALP SERPL-CCNC: 66 U/L (ref 40–150)
ALT SERPL W P-5'-P-CCNC: 30 U/L (ref 0–70)
ANION GAP SERPL CALCULATED.3IONS-SCNC: 4 MMOL/L (ref 3–14)
AST SERPL W P-5'-P-CCNC: 22 U/L (ref 0–45)
BILIRUB DIRECT SERPL-MCNC: 0.3 MG/DL (ref 0–0.2)
BILIRUB SERPL-MCNC: 1.3 MG/DL (ref 0.2–1.3)
BUN SERPL-MCNC: 18 MG/DL (ref 7–30)
CALCIUM SERPL-MCNC: 9.3 MG/DL (ref 8.5–10.1)
CHLORIDE SERPL-SCNC: 108 MMOL/L (ref 94–109)
CO2 SERPL-SCNC: 28 MMOL/L (ref 20–32)
CREAT SERPL-MCNC: 0.97 MG/DL (ref 0.66–1.25)
ERYTHROCYTE [DISTWIDTH] IN BLOOD BY AUTOMATED COUNT: 12.2 % (ref 10–15)
GFR SERPL CREATININE-BSD FRML MDRD: 84 ML/MIN/{1.73_M2}
GLUCOSE SERPL-MCNC: 95 MG/DL (ref 70–99)
HCT VFR BLD AUTO: 50.2 % (ref 40–53)
HGB BLD-MCNC: 17 G/DL (ref 13.3–17.7)
INR PPP: 1.01 (ref 0.86–1.14)
MCH RBC QN AUTO: 29.8 PG (ref 26.5–33)
MCHC RBC AUTO-ENTMCNC: 33.9 G/DL (ref 31.5–36.5)
MCV RBC AUTO: 88 FL (ref 78–100)
PLATELET # BLD AUTO: 115 10E9/L (ref 150–450)
POTASSIUM SERPL-SCNC: 4.2 MMOL/L (ref 3.4–5.3)
PROT SERPL-MCNC: 7.6 G/DL (ref 6.8–8.8)
RBC # BLD AUTO: 5.7 10E12/L (ref 4.4–5.9)
SODIUM SERPL-SCNC: 140 MMOL/L (ref 133–144)
WBC # BLD AUTO: 4 10E9/L (ref 4–11)

## 2020-02-19 PROCEDURE — 85027 COMPLETE CBC AUTOMATED: CPT | Performed by: INTERNAL MEDICINE

## 2020-02-19 PROCEDURE — 36415 COLL VENOUS BLD VENIPUNCTURE: CPT | Performed by: INTERNAL MEDICINE

## 2020-02-19 PROCEDURE — 85610 PROTHROMBIN TIME: CPT | Performed by: INTERNAL MEDICINE

## 2020-02-19 PROCEDURE — 80048 BASIC METABOLIC PNL TOTAL CA: CPT | Performed by: INTERNAL MEDICINE

## 2020-02-19 PROCEDURE — 87517 HEPATITIS B DNA QUANT: CPT | Performed by: INTERNAL MEDICINE

## 2020-02-19 PROCEDURE — G0463 HOSPITAL OUTPT CLINIC VISIT: HCPCS | Mod: ZF

## 2020-02-19 PROCEDURE — 80076 HEPATIC FUNCTION PANEL: CPT | Performed by: INTERNAL MEDICINE

## 2020-02-19 ASSESSMENT — PAIN SCALES - GENERAL: PAINLEVEL: NO PAIN (0)

## 2020-02-19 ASSESSMENT — MIFFLIN-ST. JEOR: SCORE: 1351.96

## 2020-02-19 NOTE — PROGRESS NOTES
[facility]    Hepatology follow-up    Follow-up visit for HBV cirrhosis    Subjective:  59 year old male    HBV  - dx 2015  - GT-C  - eAg negative, eAb positive  - on entecavir since 2015  - HBV DNA undetectable, July 2019      Cirrhosis  - dx 2015  - HBV  - hx ascites, resolved with antiviral therapy  - no hx variceal bleed or HE  - EGD Aug 2018- small EV, moderate portal hypertensive gastropathy  - HCC screening- abd U/S 2/19/2020    Patient comes to clinic this morning for follow-up of hep B cirrhosis.  Last clinic visit Aug 2019.  Patient underwent a screening colonoscopy in Oct 2019 with removal of 2 small hyperplastic polyps.  Patient denies new medications, ER visits or hospital admissions since last clinic visit.    Patient is well today.  He denies any symptoms specific to liver disease.  He continues to ask if he needs to take entecavir long-term.    Patient denies jaundice, lower extremity edema, abdominal distension, lethargy or confusion.    Patient denies melena, hematemesis or hematochezia.    Patient denies fevers, sweats or chills.  Weight stable.    Patient is fully compliant with his medications.    Patient denies alcohol use.      Medical hx Surgical hx   Past Medical History:   Diagnosis Date     Chronic hepatitis B (H)      Cirrhosis (H)      Perforation of tympanic membrane, unspecified     Right, chronic, from remote infection     Pupil irregular of right eye 2012    traumatic, from tennis ball      Past Surgical History:   Procedure Laterality Date     COLONOSCOPY N/A 10/24/2019    Procedure: COLONOSCOPY, WITH POLYPECTOMY AND BIOPSY;  Surgeon: Julio Barajas MD;  Location:  GI     ESOPHAGOSCOPY, GASTROSCOPY, DUODENOSCOPY (EGD), COMBINED N/A 1/6/2016    Procedure: COMBINED ESOPHAGOSCOPY, GASTROSCOPY, DUODENOSCOPY (EGD);  Surgeon: Jordan Wilson MD;  Location:  GI     ESOPHAGOSCOPY, GASTROSCOPY, DUODENOSCOPY (EGD), COMBINED N/A 8/16/2018     "Procedure: COMBINED ESOPHAGOSCOPY, GASTROSCOPY, DUODENOSCOPY (EGD);  EGD;  Surgeon: Julio Barajas MD;  Location: U GI     NO HISTORY OF SURGERY      that he can recall          Medications  Prior to Admission medications    Medication Sig Start Date End Date Taking? Authorizing Provider   entecavir (BARACLUDE) 0.5 MG tablet TAKE 2 TABLETS (1 MG) BY MOUTH DAILY 7/22/19   Julio Barajas MD   polyethylene glycol (GOLYTELY/NULYTELY) 236 g suspension Take 4,000 mLs (4 L) by mouth once for 1 dose 10/21/19 10/21/19  Julio Barajas MD       Allergies  No Known Allergies    Review of systems  A 10-point review of systems was negative    Examination  /78   Pulse (!) 48   Temp 97.8  F (36.6  C) (Oral)   Ht 1.702 m (5' 7\")   Wt 58.3 kg (128 lb 9.6 oz)   SpO2 94%   BMI 20.14 kg/m    Body mass index is 20.14 kg/m .    Gen- well, NAD, A+Ox3, normal color  CVS- RRR  RS- CTA  Abd- soft, non-tender, no ascites or organomegaly on palpation or percussion, BS+  Extr- hands normal, no YASMIN  Skin- no rash or jaundice  Neuro- no asterixis  Psych- normal mood    Laboratory  Lab Results   Component Value Date     02/19/2020    POTASSIUM 4.2 02/19/2020    CHLORIDE 108 02/19/2020    CO2 28 02/19/2020    BUN 18 02/19/2020    CR 0.97 02/19/2020       Lab Results   Component Value Date    BILITOTAL 1.3 02/19/2020    ALT 30 02/19/2020    AST 22 02/19/2020    ALKPHOS 66 02/19/2020       Lab Results   Component Value Date    ALBUMIN 4.1 02/19/2020    PROTTOTAL 7.6 02/19/2020        Lab Results   Component Value Date    WBC 4.0 02/19/2020    HGB 17.0 02/19/2020    MCV 88 02/19/2020     02/19/2020       Lab Results   Component Value Date    INR 1.01 02/19/2020       Radiology  Abd U/S 2/19/2020 reviewed- no mass or ascites    Assessment  59 year old male who presents for routine follow-up of previously decompensated HBV cirrhosis.  MELD-Na= 7 (stable).  No evidence of ascites or hepatic " encephalopathy.  Liver function tests normal on antiviral therapy.  Up to date with surveillance of esophageal varices.  Up to date with HCC screening.    Plan  1.  Continue entecavir 1mg PO Q24  2.  Abd U/S in 6 months  3.  Follow-up in 6 months    Julio Young MD  Hepatology  [facility]

## 2020-02-19 NOTE — NURSING NOTE
"Chief Complaint   Patient presents with     RECHECK     6 month f/u for HBV cirrhosis     Blood pressure 115/78, pulse (!) 48, temperature 97.8  F (36.6  C), temperature source Oral, height 1.702 m (5' 7\"), weight 58.3 kg (128 lb 9.6 oz), SpO2 94 %.    Leticia Dai, [facility]    "

## 2020-02-21 LAB
HBV DNA SERPL NAA+PROBE-ACNC: <20 [IU]/ML
HBV DNA SERPL NAA+PROBE-LOG IU: <1.3 {LOG_IU}/ML

## 2020-06-22 DIAGNOSIS — B18.1 CHRONIC HEPATITIS B WITH CIRRHOSIS (H): ICD-10-CM

## 2020-06-22 DIAGNOSIS — K74.60 CHRONIC HEPATITIS B WITH CIRRHOSIS (H): ICD-10-CM

## 2020-06-22 RX ORDER — ENTECAVIR 0.5 MG/1
TABLET, FILM COATED ORAL
Qty: 60 TABLET | Refills: 11 | Status: SHIPPED | OUTPATIENT
Start: 2020-06-22 | End: 2021-06-28

## 2020-07-03 ENCOUNTER — TELEPHONE (OUTPATIENT)
Dept: GASTROENTEROLOGY | Facility: CLINIC | Age: 60
End: 2020-07-03

## 2020-07-03 NOTE — TELEPHONE ENCOUNTER
Patient states he is no longer working and currently does not have insurance coverage for hepatitis B medication Baraclude.  Currently has enough medication to last one month.  Is hoping to find help to pay for medication until he has insurance coverage.     Flora MIRANDA LPN

## 2020-07-09 ENCOUNTER — PATIENT OUTREACH (OUTPATIENT)
Dept: CARE COORDINATION | Facility: CLINIC | Age: 60
End: 2020-07-09

## 2020-07-09 NOTE — PROGRESS NOTES
Social Work Intervention  [facility]    Data/Intervention:    Patient Name:  Jessie Diaz  /Age:  1960 (60 year old)    Visit Type: telephone  Referral Source: GAYLA Monge in Hepatology Clinic  Reason for Referral: Insurance issues/medication coverage     Collaborated With:    - Patient     Patient Concerns/Issues:   Received request to reach out to pt who lost his insurance and needs baraclude/entecavir. Called pt to get more details on situation and needs; introduced self and role of SW in hepatology clinic. Pt stated he lost his job and thus lost his insurance, but is considering [employer]ra plan and/or plan through [employer]. He is waiting for the Cobra information to arrive. Pt was concerned that if he went with Cobra, he wouldn't have coverage for 6-8 weeks and so may not have insurance when it comes time to fill his baraclude. Discussed patient assistance program for this medication and how it may be beneficial for him to apply in case he does not have insurance in place when he needs his next refill. Pt was receptive to this and asked that I email him the application link. Pt has access to a printer. Also discussed looking at [employer] plans, as this may be a cheaper option than Cobra. Pt said he's not concerned about the cost as much as the plan covering what he needs. Noted that a [employer]  would be able to assist pt navigating the options. Agreed to also email him a link to the [employer] assisters. Pt will look at the information and assess next steps. Asked that he keep us posted, katelyn if he applies for the PAP as the clinic will have to provide prescription and clinical info for this. Pt expressed understanding and thanked JUSTIN for reaching out.     Sent email to pt as discussed and received response back confirming he received the information.     Intervention/Education/Resources Provided:  - Edu & info on patient assistance program & [employer]    Assessment/Plan:  Pt was  pleasant and receptive to SW, easily engaged and open to support. Pt is assessing his insurance options at this time and has patient assistance program application should he need it. Unclear if he will be eligible as the financial guidelines are vague and pt does have 401k income per his report. Pt to keep us posted with updates/further questions, concerns.     Provided patient/family with contact information and availability.    TASHIA Valadez, [facility]  Clinical   Outpatient Speciality Clinics   [facility] [place]  Ph. [phone]    NO LETTER

## 2020-11-22 ENCOUNTER — HEALTH MAINTENANCE LETTER (OUTPATIENT)
Age: 60
End: 2020-11-22

## 2021-01-14 ENCOUNTER — TELEPHONE (OUTPATIENT)
Dept: GASTROENTEROLOGY | Facility: CLINIC | Age: 61
End: 2021-01-14

## 2021-01-14 NOTE — TELEPHONE ENCOUNTER
Called to check in with patient.  Let him know he is due for follow up with Dr. Young with labs and US.   Patient states he is waiting on new insurance card to ensure he is covered before he schedules appointments.  Gave him number to call writer directly when he is insured and will coordinates appointments.      Flora MIRANDA LPN  Hepatology Clinic

## 2021-03-23 ENCOUNTER — TELEPHONE (OUTPATIENT)
Dept: GASTROENTEROLOGY | Facility: CLINIC | Age: 61
End: 2021-03-23

## 2021-03-23 DIAGNOSIS — B18.1 CHRONIC HEPATITIS B WITH CIRRHOSIS (H): Primary | ICD-10-CM

## 2021-03-23 DIAGNOSIS — K74.60 CHRONIC HEPATITIS B WITH CIRRHOSIS (H): Primary | ICD-10-CM

## 2021-03-23 NOTE — TELEPHONE ENCOUNTER
Lab and US orders placed.    Flora MIRANDA LPN  Hepatology Clinic     [facility]    Phone Message    May a detailed message be left on voicemail: yes     Reason for Call: Order(s): Other:   Reason for requested: US orders  Date needed: ASAP  Provider name: Dr. Young    Please call patient to verify location that patient would like to go for appointment. Please call patient at [phone] to discuss.      Action Taken: Message routed to:  [facility] ([facility]): [facility] Hep    Travel Screening: Not Applicable

## 2021-04-11 DIAGNOSIS — K74.60 CHRONIC HEPATITIS B WITH CIRRHOSIS (H): ICD-10-CM

## 2021-04-11 DIAGNOSIS — B18.1 CHRONIC HEPATITIS B WITH CIRRHOSIS (H): ICD-10-CM

## 2021-04-11 LAB
ERYTHROCYTE [DISTWIDTH] IN BLOOD BY AUTOMATED COUNT: 12.4 % (ref 10–15)
HCT VFR BLD AUTO: 48.5 % (ref 40–53)
HGB BLD-MCNC: 16.4 G/DL (ref 13.3–17.7)
MCH RBC QN AUTO: 29.7 PG (ref 26.5–33)
MCHC RBC AUTO-ENTMCNC: 33.8 G/DL (ref 31.5–36.5)
MCV RBC AUTO: 88 FL (ref 78–100)
PLATELET # BLD AUTO: 128 10E9/L (ref 150–450)
RBC # BLD AUTO: 5.52 10E12/L (ref 4.4–5.9)
WBC # BLD AUTO: 3.4 10E9/L (ref 4–11)

## 2021-04-11 PROCEDURE — 85610 PROTHROMBIN TIME: CPT | Performed by: INTERNAL MEDICINE

## 2021-04-11 PROCEDURE — 80048 BASIC METABOLIC PNL TOTAL CA: CPT | Performed by: INTERNAL MEDICINE

## 2021-04-11 PROCEDURE — 36415 COLL VENOUS BLD VENIPUNCTURE: CPT | Performed by: INTERNAL MEDICINE

## 2021-04-11 PROCEDURE — 87517 HEPATITIS B DNA QUANT: CPT | Performed by: INTERNAL MEDICINE

## 2021-04-11 PROCEDURE — 85027 COMPLETE CBC AUTOMATED: CPT | Performed by: INTERNAL MEDICINE

## 2021-04-11 PROCEDURE — 80076 HEPATIC FUNCTION PANEL: CPT | Performed by: INTERNAL MEDICINE

## 2021-04-12 LAB
ALBUMIN SERPL-MCNC: 4 G/DL (ref 3.4–5)
ALP SERPL-CCNC: 79 U/L (ref 40–150)
ALT SERPL W P-5'-P-CCNC: 32 U/L (ref 0–70)
ANION GAP SERPL CALCULATED.3IONS-SCNC: <1 MMOL/L (ref 3–14)
AST SERPL W P-5'-P-CCNC: 24 U/L (ref 0–45)
BILIRUB DIRECT SERPL-MCNC: 0.3 MG/DL (ref 0–0.2)
BILIRUB SERPL-MCNC: 1 MG/DL (ref 0.2–1.3)
BUN SERPL-MCNC: 21 MG/DL (ref 7–30)
CALCIUM SERPL-MCNC: 9 MG/DL (ref 8.5–10.1)
CHLORIDE SERPL-SCNC: 107 MMOL/L (ref 94–109)
CO2 SERPL-SCNC: 31 MMOL/L (ref 20–32)
CREAT SERPL-MCNC: 1 MG/DL (ref 0.66–1.25)
GFR SERPL CREATININE-BSD FRML MDRD: 80 ML/MIN/{1.73_M2}
GLUCOSE SERPL-MCNC: 94 MG/DL (ref 70–99)
HBV DNA SERPL NAA+PROBE-ACNC: NORMAL [IU]/ML
HBV DNA SERPL NAA+PROBE-LOG IU: NORMAL {LOG_IU}/ML
INR PPP: 0.95 (ref 0.86–1.14)
POTASSIUM SERPL-SCNC: 4.3 MMOL/L (ref 3.4–5.3)
PROT SERPL-MCNC: 7.3 G/DL (ref 6.8–8.8)
SODIUM SERPL-SCNC: 138 MMOL/L (ref 133–144)

## 2021-04-14 ENCOUNTER — ANCILLARY PROCEDURE (OUTPATIENT)
Dept: ULTRASOUND IMAGING | Facility: CLINIC | Age: 61
End: 2021-04-14
Attending: INTERNAL MEDICINE
Payer: COMMERCIAL

## 2021-04-14 DIAGNOSIS — K74.60 CHRONIC HEPATITIS B WITH CIRRHOSIS (H): ICD-10-CM

## 2021-04-14 DIAGNOSIS — B18.1 CHRONIC HEPATITIS B WITH CIRRHOSIS (H): ICD-10-CM

## 2021-04-14 PROCEDURE — 76700 US EXAM ABDOM COMPLETE: CPT | Performed by: RADIOLOGY

## 2021-04-21 ENCOUNTER — VIRTUAL VISIT (OUTPATIENT)
Dept: GASTROENTEROLOGY | Facility: CLINIC | Age: 61
End: 2021-04-21
Attending: INTERNAL MEDICINE
Payer: COMMERCIAL

## 2021-04-21 DIAGNOSIS — K74.60 CHRONIC HEPATITIS B WITH CIRRHOSIS (H): Primary | ICD-10-CM

## 2021-04-21 DIAGNOSIS — B18.1 CHRONIC HEPATITIS B WITH CIRRHOSIS (H): Primary | ICD-10-CM

## 2021-04-21 PROCEDURE — 99214 OFFICE O/P EST MOD 30 MIN: CPT | Mod: 95 | Performed by: INTERNAL MEDICINE

## 2021-04-21 NOTE — LETTER
4/21/2021         RE: Jessie Diaz  [address]  [place] [address]        Dear Colleague,    Thank you for referring your patient, Jessie Diaz, to the [facility] HEPATOLOGY CLINIC [place]. Please see a copy of my visit note below.    Judah is a 60 year old who is being evaluated via a billable video visit.      How would you like to obtain your AVS? MyChart  If the video visit is dropped, the invitation should be resent by: Text to cell phone: [phone]  Will anyone else be joining your video visit? No      Video Start Time: 8:38 AM  Video-Visit Details    Type of service:  Video Visit    Video End Time:8:52 AM    Originating Location (pt. Location): Home    Distant Location (provider location):  [facility] HEPATOLOGY [facility]     Platform used for Video Visit: John REEVES CMA  ______________________________________________________________________________________    [facility]    Hepatology follow-up    Follow-up visit for cirrhosis    Subjective:  60 year old male    HBV  - dx 2015  - GT-C  - eAg negative, eAb positive  - on entecavir since 2015  - HBV DNA undetectable, April 2021      Cirrhosis  - dx 2015  - HBV  - hx ascites, resolved with antiviral therapy  - no hx variceal bleed or HE  - EGD Aug 2018- small EV, moderate portal hypertensive gastropathy  - HCC screening- abd U/S 4/2021    Last visit Feb 2020.  No new medications, ER visits or hospital admissions since last clinic visit.    Patient is well today.  He has no symptoms specific to liver disease.    Patient denies jaundice, lower extremity edema, abdominal distension, lethargy or confusion.    Patient denies melena, hematemesis or hematochezia.    Patient denies fevers, sweats or chills.  No cough or dyspnea.  Patient completed his COVID-19 vaccinations earlier this week.    Weight stable.  Appetite is good.    Patient is fully compliant with  his medications.    Patient does not drink alcohol.  Patient was laid off from his job in June 2020 due to COVID cutbacks.      Medical hx Surgical hx   Past Medical History:   Diagnosis Date     Chronic hepatitis B (H)      Cirrhosis (H)      Perforation of tympanic membrane, unspecified     Right, chronic, from remote infection     Pupil irregular of right eye 2012    traumatic, from tennis ball      Past Surgical History:   Procedure Laterality Date     COLONOSCOPY N/A 10/24/2019    Procedure: COLONOSCOPY, WITH POLYPECTOMY AND BIOPSY;  Surgeon: Julio Barajas MD;  Location:  GI     ESOPHAGOSCOPY, GASTROSCOPY, DUODENOSCOPY (EGD), COMBINED N/A 1/6/2016    Procedure: COMBINED ESOPHAGOSCOPY, GASTROSCOPY, DUODENOSCOPY (EGD);  Surgeon: Jordan Wilson MD;  Location:  GI     ESOPHAGOSCOPY, GASTROSCOPY, DUODENOSCOPY (EGD), COMBINED N/A 8/16/2018    Procedure: COMBINED ESOPHAGOSCOPY, GASTROSCOPY, DUODENOSCOPY (EGD);  EGD;  Surgeon: Julio Barajas MD;  Location: [facility]     NO HISTORY OF SURGERY      that he can recall          Medications  Prior to Admission medications    Medication Sig Start Date End Date Taking? Authorizing Provider   entecavir (BARACLUDE) 0.5 MG tablet TAKE 2 TABLETS BY MOUTH DAILY ON AN EMPTY STOMACH 6/22/20  Yes Julio Barajas MD       Allergies  No Known Allergies    Review of systems  A 10-point review of systems was negative    Examination  Gen- well, NAD, A+Ox3, normal color  Skin- no rash or jaundice  Neuro- no asterixis  Psych- normal mood    Laboratory  Lab Results   Component Value Date     04/11/2021    POTASSIUM 4.3 04/11/2021    CHLORIDE 107 04/11/2021    CO2 31 04/11/2021    BUN 21 04/11/2021    CR 1.00 04/11/2021       Lab Results   Component Value Date    BILITOTAL 1.0 04/11/2021    ALT 32 04/11/2021    AST 24 04/11/2021    ALKPHOS 79 04/11/2021       Lab Results   Component Value Date    ALBUMIN 4.0 04/11/2021    PROTTOTAL 7.3 04/11/2021         Lab Results   Component Value Date    WBC 3.4 04/11/2021    HGB 16.4 04/11/2021    MCV 88 04/11/2021     04/11/2021       Lab Results   Component Value Date    INR 0.95 04/11/2021       Radiology  Abd U/S April 2021 reviewed    Assessment  60 year old male who presents for routine follow-up of history of decompensated HBV cirrhosis.  MELD-Na= 6 (stable).  No evidence of ascites or hepatic encephalopathy on antiviral therapy.  Due for surveillance of esophageal varices.  Up to date with HCC screening.    Plan  1.  Continue entecavir  2.  EGD with me  3.  Low Na diet  4.  Follow-up in 6 months    Julio Young MD  Hepatology  [facility]

## 2021-04-21 NOTE — PROGRESS NOTES
Judah is a 60 year old who is being evaluated via a billable video visit.      How would you like to obtain your AVS? MyChart  If the video visit is dropped, the invitation should be resent by: Text to cell phone: [phone]  Will anyone else be joining your video visit? No      Video Start Time: 8:38 AM  Video-Visit Details    Type of service:  Video Visit    Video End Time:8:52 AM    Originating Location (pt. Location): Home    Distant Location (provider location):  [facility] HEPATOLOGY CLIN[facility] [place]     Platform used for Video Visit: [facility]    Skye REEVES, MARCOS  ______________________________________________________________________________________    [facility]    Hepatology follow-up    Follow-up visit for cirrhosis    Subjective:  60 year old male    HBV  - dx 2015  - GT-C  - eAg negative, eAb positive  - on entecavir since 2015  - HBV DNA undetectable, April 2021      Cirrhosis  - dx 2015  - HBV  - hx ascites, resolved with antiviral therapy  - no hx variceal bleed or HE  - EGD Aug 2018- small EV, moderate portal hypertensive gastropathy  - HCC screening- abd U/S 4/2021    Last visit Feb 2020.  No new medications, ER visits or hospital admissions since last clinic visit.    Patient is well today.  He has no symptoms specific to liver disease.    Patient denies jaundice, lower extremity edema, abdominal distension, lethargy or confusion.    Patient denies melena, hematemesis or hematochezia.    Patient denies fevers, sweats or chills.  No cough or dyspnea.  Patient completed his COVID-19 vaccinations earlier this week.    Weight stable.  Appetite is good.    Patient is fully compliant with his medications.    Patient does not drink alcohol.  Patient was laid off from his job in June 2020 due to COVID cutbacks.      Medical hx Surgical hx   Past Medical History:   Diagnosis Date     Chronic hepatitis B (H)      Cirrhosis (H)      Perforation of tympanic membrane,  unspecified     Right, chronic, from remote infection     Pupil irregular of right eye 2012    traumatic, from tennis ball      Past Surgical History:   Procedure Laterality Date     COLONOSCOPY N/A 10/24/2019    Procedure: COLONOSCOPY, WITH POLYPECTOMY AND BIOPSY;  Surgeon: Julio Barajas MD;  Location:  GI     ESOPHAGOSCOPY, GASTROSCOPY, DUODENOSCOPY (EGD), COMBINED N/A 1/6/2016    Procedure: COMBINED ESOPHAGOSCOPY, GASTROSCOPY, DUODENOSCOPY (EGD);  Surgeon: Jordan Wilson MD;  Location:  GI     ESOPHAGOSCOPY, GASTROSCOPY, DUODENOSCOPY (EGD), COMBINED N/A 8/16/2018    Procedure: COMBINED ESOPHAGOSCOPY, GASTROSCOPY, DUODENOSCOPY (EGD);  EGD;  Surgeon: Julio Barajas MD;  Location:  GI     NO HISTORY OF SURGERY      that he can recall          Medications  Prior to Admission medications    Medication Sig Start Date End Date Taking? Authorizing Provider   entecavir (BARACLUDE) 0.5 MG tablet TAKE 2 TABLETS BY MOUTH DAILY ON AN EMPTY STOMACH 6/22/20  Yes Julio Barajas MD       Allergies  No Known Allergies    Review of systems  A 10-point review of systems was negative    Examination  Gen- well, NAD, A+Ox3, normal color  Skin- no rash or jaundice  Neuro- no asterixis  Psych- normal mood    Laboratory  Lab Results   Component Value Date     04/11/2021    POTASSIUM 4.3 04/11/2021    CHLORIDE 107 04/11/2021    CO2 31 04/11/2021    BUN 21 04/11/2021    CR 1.00 04/11/2021       Lab Results   Component Value Date    BILITOTAL 1.0 04/11/2021    ALT 32 04/11/2021    AST 24 04/11/2021    ALKPHOS 79 04/11/2021       Lab Results   Component Value Date    ALBUMIN 4.0 04/11/2021    PROTTOTAL 7.3 04/11/2021        Lab Results   Component Value Date    WBC 3.4 04/11/2021    HGB 16.4 04/11/2021    MCV 88 04/11/2021     04/11/2021       Lab Results   Component Value Date    INR 0.95 04/11/2021       Radiology  Abd U/S April 2021 reviewed    Assessment  60 year old male who  presents for routine follow-up of history of decompensated HBV cirrhosis.  MELD-Na= 6 (stable).  No evidence of ascites or hepatic encephalopathy on antiviral therapy.  Due for surveillance of esophageal varices.  Up to date with HCC screening.    Plan  1.  Continue entecavir  2.  EGD with me  3.  Low Na diet  4.  Follow-up in 6 months    Julio Young MD  Hepatology  [facility]

## 2021-04-26 ENCOUNTER — TELEPHONE (OUTPATIENT)
Dept: GASTROENTEROLOGY | Facility: CLINIC | Age: 61
End: 2021-04-26

## 2021-04-26 NOTE — TELEPHONE ENCOUNTER
Patient is scheduled for EGD with Dr. MIX    Spoke with: CASSANDRAGPHRACHRAN    Date of Procedure: 06/03/2021    Location: UNIT J    Sedation Type CS    Conscious Sedation- Needs  for 6 hours after the procedure  MAC/General-Needs  for 24 hours after procedure    Pre-op for Unit J MAC and OR NOT NEEDED    (if yes advise patient they will need a pre-op prior to procedure)      Is patient on blood thinners? -NO (If yes- inform patient to follow up with PCP or provider for follow up instructions)     Informed patient they will need an adult  YES  Cannot take any type of public or medical transportation alone    Informed Patient of COVID Test Requirement YES    Preferred Pharmacy for Pre Prescription NA    Confirmed Nurse will call to complete assessment YES    Additional comments: NA

## 2021-05-19 DIAGNOSIS — Z11.59 ENCOUNTER FOR SCREENING FOR OTHER VIRAL DISEASES: ICD-10-CM

## 2021-06-01 DIAGNOSIS — Z11.59 ENCOUNTER FOR SCREENING FOR OTHER VIRAL DISEASES: ICD-10-CM

## 2021-06-01 LAB
SARS-COV-2 RNA RESP QL NAA+PROBE: NORMAL
SPECIMEN SOURCE: NORMAL

## 2021-06-01 PROCEDURE — U0005 INFEC AGEN DETEC AMPLI PROBE: HCPCS | Performed by: INTERNAL MEDICINE

## 2021-06-01 PROCEDURE — U0003 INFECTIOUS AGENT DETECTION BY NUCLEIC ACID (DNA OR RNA); SEVERE ACUTE RESPIRATORY SYNDROME CORONAVIRUS 2 (SARS-COV-2) (CORONAVIRUS DISEASE [COVID-19]), AMPLIFIED PROBE TECHNIQUE, MAKING USE OF HIGH THROUGHPUT TECHNOLOGIES AS DESCRIBED BY CMS-2020-01-R: HCPCS | Performed by: INTERNAL MEDICINE

## 2021-06-02 LAB
LABORATORY COMMENT REPORT: NORMAL
SARS-COV-2 RNA RESP QL NAA+PROBE: NEGATIVE
SPECIMEN SOURCE: NORMAL

## 2021-06-02 RX ORDER — LIDOCAINE 40 MG/G
CREAM TOPICAL
Status: CANCELLED | OUTPATIENT
Start: 2021-06-02

## 2021-06-02 RX ORDER — ONDANSETRON 2 MG/ML
4 INJECTION INTRAMUSCULAR; INTRAVENOUS
Status: CANCELLED | OUTPATIENT
Start: 2021-06-02

## 2021-06-03 ENCOUNTER — HOSPITAL ENCOUNTER (OUTPATIENT)
Facility: CLINIC | Age: 61
Discharge: HOME | End: 2021-06-03
Attending: INTERNAL MEDICINE | Admitting: INTERNAL MEDICINE
Payer: COMMERCIAL

## 2021-06-03 VITALS
BODY MASS INDEX: 19.62 KG/M2 | OXYGEN SATURATION: 98 % | SYSTOLIC BLOOD PRESSURE: 137 MMHG | RESPIRATION RATE: 15 BRPM | HEIGHT: 67 IN | TEMPERATURE: 98.7 F | DIASTOLIC BLOOD PRESSURE: 83 MMHG | WEIGHT: 125 LBS | HEART RATE: 53 BPM

## 2021-06-03 LAB — UPPER GI ENDOSCOPY: NORMAL

## 2021-06-03 PROCEDURE — 999N000099 HC STATISTIC MODERATE SEDATION < 10 MIN: Performed by: INTERNAL MEDICINE

## 2021-06-03 PROCEDURE — 250N000011 HC RX IP 250 OP 636: Performed by: INTERNAL MEDICINE

## 2021-06-03 PROCEDURE — 250N000009 HC RX 250: Performed by: INTERNAL MEDICINE

## 2021-06-03 PROCEDURE — 43235 EGD DIAGNOSTIC BRUSH WASH: CPT | Performed by: INTERNAL MEDICINE

## 2021-06-03 RX ORDER — ONDANSETRON 4 MG/1
4 TABLET, ORALLY DISINTEGRATING ORAL EVERY 6 HOURS PRN
Status: DISCONTINUED | OUTPATIENT
Start: 2021-06-03 | End: 2021-06-03 | Stop reason: HOSPADM

## 2021-06-03 RX ORDER — NALOXONE HYDROCHLORIDE 0.4 MG/ML
0.2 INJECTION, SOLUTION INTRAMUSCULAR; INTRAVENOUS; SUBCUTANEOUS
Status: DISCONTINUED | OUTPATIENT
Start: 2021-06-03 | End: 2021-06-03 | Stop reason: HOSPADM

## 2021-06-03 RX ORDER — ONDANSETRON 2 MG/ML
4 INJECTION INTRAMUSCULAR; INTRAVENOUS EVERY 6 HOURS PRN
Status: DISCONTINUED | OUTPATIENT
Start: 2021-06-03 | End: 2021-06-03 | Stop reason: HOSPADM

## 2021-06-03 RX ORDER — NALOXONE HYDROCHLORIDE 0.4 MG/ML
0.4 INJECTION, SOLUTION INTRAMUSCULAR; INTRAVENOUS; SUBCUTANEOUS
Status: DISCONTINUED | OUTPATIENT
Start: 2021-06-03 | End: 2021-06-03 | Stop reason: HOSPADM

## 2021-06-03 RX ORDER — PROCHLORPERAZINE MALEATE 10 MG
10 TABLET ORAL EVERY 6 HOURS PRN
Status: DISCONTINUED | OUTPATIENT
Start: 2021-06-03 | End: 2021-06-03 | Stop reason: HOSPADM

## 2021-06-03 RX ORDER — FLUMAZENIL 0.1 MG/ML
0.2 INJECTION, SOLUTION INTRAVENOUS
Status: DISCONTINUED | OUTPATIENT
Start: 2021-06-03 | End: 2021-06-03 | Stop reason: HOSPADM

## 2021-06-03 RX ORDER — FENTANYL CITRATE 50 UG/ML
INJECTION, SOLUTION INTRAMUSCULAR; INTRAVENOUS PRN
Status: COMPLETED | OUTPATIENT
Start: 2021-06-03 | End: 2021-06-03

## 2021-06-03 RX ADMIN — TOPICAL ANESTHETIC 1 SPRAY: 200 SPRAY DENTAL; PERIODONTAL at 11:28

## 2021-06-03 RX ADMIN — FENTANYL CITRATE 100 MCG: 50 INJECTION, SOLUTION INTRAMUSCULAR; INTRAVENOUS at 11:28

## 2021-06-03 RX ADMIN — MIDAZOLAM 2 MG: 1 INJECTION INTRAMUSCULAR; INTRAVENOUS at 11:28

## 2021-06-03 ASSESSMENT — MIFFLIN-ST. JEOR: SCORE: 1325.63

## 2021-06-28 ENCOUNTER — TELEPHONE (OUTPATIENT)
Dept: GASTROENTEROLOGY | Facility: CLINIC | Age: 61
End: 2021-06-28

## 2021-06-28 DIAGNOSIS — B18.1 CHRONIC HEPATITIS B WITH CIRRHOSIS (H): ICD-10-CM

## 2021-06-28 DIAGNOSIS — K74.60 CHRONIC HEPATITIS B WITH CIRRHOSIS (H): ICD-10-CM

## 2021-06-28 RX ORDER — ENTECAVIR 0.5 MG/1
TABLET, FILM COATED ORAL
Qty: 60 TABLET | Refills: 11 | Status: SHIPPED | OUTPATIENT
Start: 2021-06-28 | End: 2021-07-21

## 2021-06-28 RX ORDER — ENTECAVIR 0.5 MG/1
TABLET, FILM COATED ORAL
Qty: 60 TABLET | Refills: 11 | Status: SHIPPED | OUTPATIENT
Start: 2021-06-28 | End: 2021-06-28

## 2021-07-21 ENCOUNTER — TELEPHONE (OUTPATIENT)
Dept: GASTROENTEROLOGY | Facility: CLINIC | Age: 61
End: 2021-07-21

## 2021-07-21 DIAGNOSIS — K74.60 CHRONIC HEPATITIS B WITH CIRRHOSIS (H): ICD-10-CM

## 2021-07-21 DIAGNOSIS — B18.1 CHRONIC HEPATITIS B WITH CIRRHOSIS (H): ICD-10-CM

## 2021-07-21 RX ORDER — ENTECAVIR 0.5 MG/1
TABLET, FILM COATED ORAL
Qty: 60 TABLET | Refills: 11 | Status: SHIPPED | OUTPATIENT
Start: 2021-07-21 | End: 2022-07-05

## 2021-07-21 NOTE — TELEPHONE ENCOUNTER
Prescription was discontinued.  Pharmacy was told to discontinue by patient's provider.  No information on which provider.  Let pharmacy know patient is to stay on this medication. Verbal order given for refill of entecavir 0.5 mg tablet.  Also let patient know to stay on medication.   Flora MIRANDA LPN  Hepatology Clinic       [facility] Call Center    Phone Message    May a detailed message be left on voicemail: yes     Reason for Call: Other: Please give the pt a call back because pt is stating that the medication entecavir (BARACLUDE) 0.5 MG tablet has been discontinued via [facility]. Pt wants to know what his options are. Thank you.     Action Taken: Message routed to:  [facility] [facility]    Travel Screening: Not Applicable

## 2021-09-19 ENCOUNTER — HEALTH MAINTENANCE LETTER (OUTPATIENT)
Age: 61
End: 2021-09-19

## 2022-01-09 ENCOUNTER — HEALTH MAINTENANCE LETTER (OUTPATIENT)
Age: 62
End: 2022-01-09

## 2022-02-25 ENCOUNTER — TELEPHONE (OUTPATIENT)
Dept: GASTROENTEROLOGY | Facility: CLINIC | Age: 62
End: 2022-02-25
Payer: COMMERCIAL

## 2022-02-25 DIAGNOSIS — K74.60 CHRONIC HEPATITIS B WITH CIRRHOSIS (H): Primary | ICD-10-CM

## 2022-02-25 DIAGNOSIS — B18.1 CHRONIC HEPATITIS B WITH CIRRHOSIS (H): Primary | ICD-10-CM

## 2022-02-25 NOTE — TELEPHONE ENCOUNTER
Lab orders placed.    Flora MIRANDA LPN  Hepatology Clinic    [facility] Call Center    Phone Message    May a detailed message be left on voicemail: yes     Reason for Call: Order(s): Other:   Reason for requested: Lab Orders   Date needed: 03/15-03/20  Provider name: Julio MENDEZ    Pt is schedule for an appt on 03/21.  He is requesting lab orders be placed. He will do the labs at the [facility] in [place] between 3/15 and 3/20    Action Taken: Message routed to:  [facility] Hep    Travel Screening: Not Applicable

## 2022-03-17 ENCOUNTER — LAB (OUTPATIENT)
Dept: LAB | Facility: CLINIC | Age: 62
End: 2022-03-17
Payer: COMMERCIAL

## 2022-03-17 DIAGNOSIS — K74.60 CHRONIC HEPATITIS B WITH CIRRHOSIS (H): ICD-10-CM

## 2022-03-17 DIAGNOSIS — B18.1 CHRONIC HEPATITIS B WITH CIRRHOSIS (H): ICD-10-CM

## 2022-03-17 LAB
ALBUMIN SERPL-MCNC: 3.9 G/DL (ref 3.4–5)
ALP SERPL-CCNC: 69 U/L (ref 40–150)
ALT SERPL W P-5'-P-CCNC: 28 U/L (ref 0–70)
ANION GAP SERPL CALCULATED.3IONS-SCNC: 3 MMOL/L (ref 3–14)
AST SERPL W P-5'-P-CCNC: 19 U/L (ref 0–45)
BILIRUB DIRECT SERPL-MCNC: 0.2 MG/DL (ref 0–0.2)
BILIRUB SERPL-MCNC: 0.8 MG/DL (ref 0.2–1.3)
BUN SERPL-MCNC: 16 MG/DL (ref 7–30)
CALCIUM SERPL-MCNC: 9 MG/DL (ref 8.5–10.1)
CHLORIDE BLD-SCNC: 108 MMOL/L (ref 94–109)
CO2 SERPL-SCNC: 29 MMOL/L (ref 20–32)
CREAT SERPL-MCNC: 1 MG/DL (ref 0.66–1.25)
ERYTHROCYTE [DISTWIDTH] IN BLOOD BY AUTOMATED COUNT: 12.1 % (ref 10–15)
GFR SERPL CREATININE-BSD FRML MDRD: 85 ML/MIN/1.73M2
GLUCOSE BLD-MCNC: 101 MG/DL (ref 70–99)
HCT VFR BLD AUTO: 46.9 % (ref 40–53)
HGB BLD-MCNC: 16.2 G/DL (ref 13.3–17.7)
INR PPP: 1 (ref 0.85–1.15)
MCH RBC QN AUTO: 30.3 PG (ref 26.5–33)
MCHC RBC AUTO-ENTMCNC: 34.5 G/DL (ref 31.5–36.5)
MCV RBC AUTO: 88 FL (ref 78–100)
PLATELET # BLD AUTO: 115 10E3/UL (ref 150–450)
POTASSIUM BLD-SCNC: 4.4 MMOL/L (ref 3.4–5.3)
PROT SERPL-MCNC: 7.4 G/DL (ref 6.8–8.8)
RBC # BLD AUTO: 5.34 10E6/UL (ref 4.4–5.9)
SODIUM SERPL-SCNC: 140 MMOL/L (ref 133–144)
WBC # BLD AUTO: 3.4 10E3/UL (ref 4–11)

## 2022-03-17 PROCEDURE — 85610 PROTHROMBIN TIME: CPT

## 2022-03-17 PROCEDURE — 36415 COLL VENOUS BLD VENIPUNCTURE: CPT

## 2022-03-17 PROCEDURE — 80053 COMPREHEN METABOLIC PANEL: CPT

## 2022-03-17 PROCEDURE — 87517 HEPATITIS B DNA QUANT: CPT

## 2022-03-17 PROCEDURE — 85027 COMPLETE CBC AUTOMATED: CPT

## 2022-03-17 PROCEDURE — 82248 BILIRUBIN DIRECT: CPT

## 2022-03-18 LAB — HBV DNA SERPL NAA+PROBE-ACNC: NOT DETECTED IU/ML

## 2022-03-21 ENCOUNTER — VIRTUAL VISIT (OUTPATIENT)
Dept: GASTROENTEROLOGY | Facility: CLINIC | Age: 62
End: 2022-03-21
Attending: INTERNAL MEDICINE
Payer: COMMERCIAL

## 2022-03-21 DIAGNOSIS — K74.60 CHRONIC HEPATITIS B WITH CIRRHOSIS (H): Primary | ICD-10-CM

## 2022-03-21 DIAGNOSIS — B18.1 CHRONIC HEPATITIS B WITH CIRRHOSIS (H): Primary | ICD-10-CM

## 2022-03-21 PROCEDURE — G0463 HOSPITAL OUTPT CLINIC VISIT: HCPCS | Mod: PN,RTG | Performed by: INTERNAL MEDICINE

## 2022-03-21 PROCEDURE — 99214 OFFICE O/P EST MOD 30 MIN: CPT | Mod: 95 | Performed by: INTERNAL MEDICINE

## 2022-03-21 NOTE — PROGRESS NOTES
Jessie is a 61 year old who is being evaluated via a billable video visit. .     How would you like to obtain your AVS? MyChart  If the video visit is dropped, the invitation should be resent by: Text to cell phone: [phone]  Will anyone else be joining your video visit? No      Video Start Time: 4:06 PM  Video-Visit Details    Type of service:  Video Visit    Video End Time: 4:13 PM    Originating Location (pt. Location): Home    Distant Location (provider location):  [facility] HEPATOLOGY CLINIC [place]     Platform used for Video Visit: Shae     I was not able to get a hold of patient to update chart for video visit, but patient is video ready.    Roel Muñiz MA on 3/21/2022 at 3:50 PM  ___________________________________________________________________________    [facility] Hepatology    Follow-up Visit    Follow-up visit for cirrhosis    Subjective:  61 year old male    Cirrhosis  - dx 2015  - HBV  - hx ascites, resolved with antiviral therapy  - no hx variceal bleed or HE  - EGD Jun 2021- small EV, moderate portal hypertensive gastropathy  - HCC screening- abd U/S 4/2021    HBV  - dx 2015  - GT-C  - eAg negative, eAb positive  - on entecavir since 2015  - HBV DNA undetectable, March 2022      Last visit April 2021.  No new medications, ER visits or hospital admissions since last clinic visit.    Patient is well today.  He has no symptoms related to liver disease.    Patient denies jaundice, lower extremity edema, abdominal distension, lethargy or confusion.    Patient denies melena, hematemesis or hematochezia.    Patient denies fevers, sweats or chills.  He is vaccinated against COVID-19 but is wondering if he still needs to get a booster.    Weight stable.  Appetite is normal.    Patient is adherent to his hep B medication- he is again wondering if he needs to continue taking it.    Patient does not drink alcohol.  His family are all well and healthy.      Medical hx Surgical  hx   Past Medical History:   Diagnosis Date     Chronic hepatitis B (H)      Cirrhosis (H)      Perforation of tympanic membrane, unspecified     Right, chronic, from remote infection     Pupil irregular of right eye 2012    traumatic, from tennis ball      Past Surgical History:   Procedure Laterality Date     COLONOSCOPY N/A 10/24/2019    Procedure: COLONOSCOPY, WITH POLYPECTOMY AND BIOPSY;  Surgeon: Julio Barajas MD;  Location:  GI     ESOPHAGOSCOPY, GASTROSCOPY, DUODENOSCOPY (EGD), COMBINED N/A 1/6/2016    Procedure: COMBINED ESOPHAGOSCOPY, GASTROSCOPY, DUODENOSCOPY (EGD);  Surgeon: Jordan Wilson MD;  Location:  GI     ESOPHAGOSCOPY, GASTROSCOPY, DUODENOSCOPY (EGD), COMBINED N/A 8/16/2018    Procedure: COMBINED ESOPHAGOSCOPY, GASTROSCOPY, DUODENOSCOPY (EGD);  EGD;  Surgeon: Julio Barajas MD;  Location:  GI     ESOPHAGOSCOPY, GASTROSCOPY, DUODENOSCOPY (EGD), COMBINED N/A 6/3/2021    Procedure: ESOPHAGOGASTRODUODENOSCOPY (EGD);  Surgeon: Julio Barajas MD;  Location:  GI     NO HISTORY OF SURGERY      that he can recall          Medications  Prior to Admission medications    Medication Sig Start Date End Date Taking? Authorizing Provider   entecavir (BARACLUDE) 0.5 MG tablet TAKE 2 TABLETS BY MOUTH DAILY ON AN EMPTY STOMACH 7/21/21   Julio Barajas MD       Allergies  No Known Allergies    Review of systems  A 10-point review of systems was negative    Examination  Gen- well, NAD, A+Ox3, normal color  Eye- EOMI, no scleral icterus  Skin- no rash or jaundice  Psych- normal mood    Laboratory  Lab Results   Component Value Date     03/17/2022     04/11/2021    POTASSIUM 4.4 03/17/2022    POTASSIUM 4.3 04/11/2021    CHLORIDE 108 03/17/2022    CHLORIDE 107 04/11/2021    CO2 29 03/17/2022    CO2 31 04/11/2021    BUN 16 03/17/2022    BUN 21 04/11/2021    CR 1.00 03/17/2022    CR 1.00 04/11/2021       Lab Results   Component Value Date    BILITOTAL 0.8  03/17/2022    BILITOTAL 1.0 04/11/2021    ALT 28 03/17/2022    ALT 32 04/11/2021    AST 19 03/17/2022    AST 24 04/11/2021    ALKPHOS 69 03/17/2022    ALKPHOS 79 04/11/2021       Lab Results   Component Value Date    ALBUMIN 3.9 03/17/2022    ALBUMIN 4.0 04/11/2021    PROTTOTAL 7.4 03/17/2022    PROTTOTAL 7.3 04/11/2021        Lab Results   Component Value Date    WBC 3.4 03/17/2022    WBC 3.4 04/11/2021    HGB 16.2 03/17/2022    HGB 16.4 04/11/2021    MCV 88 03/17/2022    MCV 88 04/11/2021     03/17/2022     04/11/2021       Lab Results   Component Value Date    INR 1.00 03/17/2022    INR 0.95 04/11/2021        3/17/2022 10:11   HEP B Virus DNA Quant Not Detected       Radiology  EGD Jun 2021 personally reviewed- small varices    Assessment  61 year old male who presents for follow-up of decompensated HBV cirrhosis.  MELD-Na= 6 (stable).  No evidence of ascites or hepatic encephalopathy.  Up to date with surveillance of esophageal varices.  Overdue for HCC screening.    We discussed the importance of taking antiviral therapy for hepatitis B and the need to get COVID-19 booster to prevent serious illness, death and transmission to others.    Plan  1.  Continue entecavir  2.  Abd U/S  3.  EGD in 2023  4.  Follow-up in 6 months    Julio Young MD  Hepatology  [facility]

## 2022-03-21 NOTE — LETTER
3/21/2022         RE: Jessie Diaz  [address]  [place] [address]        Dear Colleague,    Thank you for referring your patient, Jessie Diaz, to the [facility]PATOLOG[facility] [place]. Please see a copy of my visit note below.    Jessie is a 61 year old who is being evaluated via a billable video visit. .     How would you like to obtain your AVS? MyChart  If the video visit is dropped, the invitation should be resent by: Text to cell phone: [phone]  Will anyone else be joining your video visit? No      Video Start Time: 4:06 PM  Video-Visit Details    Type of service:  Video Visit    Video End Time: 4:13 PM    Originating Location (pt. Location): Home    Distant Location (provider location):  [facility] HEPATOLOGY CLINIC [place]     Platform used for Video Visit: [employer]     I was not able to get a hold of patient to update chart for video visit, but patient is video ready.    Roel Muñiz MA on 3/21/2022 at 3:50 PM  ___________________________________________________________________________    [facility] Hepatology    Follow-up Visit    Follow-up visit for cirrhosis    Subjective:  61 year old male    Cirrhosis  - dx 2015  - HBV  - hx ascites, resolved with antiviral therapy  - no hx variceal bleed or HE  - EGD Jun 2021- small EV, moderate portal hypertensive gastropathy  - HCC screening- abd U/S 4/2021    HBV  - dx 2015  - GT-C  - eAg negative, eAb positive  - on entecavir since 2015  - HBV DNA undetectable, March 2022      Last visit April 2021.  No new medications, ER visits or hospital admissions since last clinic visit.    Patient is well today.  He has no symptoms related to liver disease.    Patient denies jaundice, lower extremity edema, abdominal distension, lethargy or confusion.    Patient denies melena, hematemesis or hematochezia.    Patient denies fevers, sweats or chills.  He is vaccinated against COVID-19 but is wondering  if he still needs to get a booster.    Weight stable.  Appetite is normal.    Patient is adherent to his hep B medication- he is again wondering if he needs to continue taking it.    Patient does not drink alcohol.  His family are all well and healthy.      Medical hx Surgical hx   Past Medical History:   Diagnosis Date     Chronic hepatitis B (H)      Cirrhosis (H)      Perforation of tympanic membrane, unspecified     Right, chronic, from remote infection     Pupil irregular of right eye 2012    traumatic, from tennis ball      Past Surgical History:   Procedure Laterality Date     COLONOSCOPY N/A 10/24/2019    Procedure: COLONOSCOPY, WITH POLYPECTOMY AND BIOPSY;  Surgeon: Julio Barajas MD;  Location:  GI     ESOPHAGOSCOPY, GASTROSCOPY, DUODENOSCOPY (EGD), COMBINED N/A 1/6/2016    Procedure: COMBINED ESOPHAGOSCOPY, GASTROSCOPY, DUODENOSCOPY (EGD);  Surgeon: Jordan Wilson MD;  Location:  GI     ESOPHAGOSCOPY, GASTROSCOPY, DUODENOSCOPY (EGD), COMBINED N/A 8/16/2018    Procedure: COMBINED ESOPHAGOSCOPY, GASTROSCOPY, DUODENOSCOPY (EGD);  EGD;  Surgeon: Julio Barajas MD;  Location:  GI     ESOPHAGOSCOPY, GASTROSCOPY, DUODENOSCOPY (EGD), COMBINED N/A 6/3/2021    Procedure: ESOPHAGOGASTRODUODENOSCOPY (EGD);  Surgeon: Julio Barajas MD;  Location: [facility]     NO HISTORY OF SURGERY      that he can recall          Medications  Prior to Admission medications    Medication Sig Start Date End Date Taking? Authorizing Provider   entecavir (BARACLUDE) 0.5 MG tablet TAKE 2 TABLETS BY MOUTH DAILY ON AN EMPTY STOMACH 7/21/21   Julio Barajas MD       Allergies  No Known Allergies    Review of systems  A 10-point review of systems was negative    Examination  Gen- well, NAD, A+Ox3, normal color  Eye- EOMI, no scleral icterus  Skin- no rash or jaundice  Psych- normal mood    Laboratory  Lab Results   Component Value Date     03/17/2022     04/11/2021    POTASSIUM 4.4  03/17/2022    POTASSIUM 4.3 04/11/2021    CHLORIDE 108 03/17/2022    CHLORIDE 107 04/11/2021    CO2 29 03/17/2022    CO2 31 04/11/2021    BUN 16 03/17/2022    BUN 21 04/11/2021    CR 1.00 03/17/2022    CR 1.00 04/11/2021       Lab Results   Component Value Date    BILITOTAL 0.8 03/17/2022    BILITOTAL 1.0 04/11/2021    ALT 28 03/17/2022    ALT 32 04/11/2021    AST 19 03/17/2022    AST 24 04/11/2021    ALKPHOS 69 03/17/2022    ALKPHOS 79 04/11/2021       Lab Results   Component Value Date    ALBUMIN 3.9 03/17/2022    ALBUMIN 4.0 04/11/2021    PROTTOTAL 7.4 03/17/2022    PROTTOTAL 7.3 04/11/2021        Lab Results   Component Value Date    WBC 3.4 03/17/2022    WBC 3.4 04/11/2021    HGB 16.2 03/17/2022    HGB 16.4 04/11/2021    MCV 88 03/17/2022    MCV 88 04/11/2021     03/17/2022     04/11/2021       Lab Results   Component Value Date    INR 1.00 03/17/2022    INR 0.95 04/11/2021        3/17/2022 10:11   HEP B Virus DNA Quant Not Detected       Radiology  EGD Jun 2021 personally reviewed- small varices    Assessment  61 year old male who presents for follow-up of decompensated HBV cirrhosis.  MELD-Na= 6 (stable).  No evidence of ascites or hepatic encephalopathy.  Up to date with surveillance of esophageal varices.  Overdue for HCC screening.    We discussed the importance of taking antiviral therapy for hepatitis B and the need to get COVID-19 booster to prevent serious illness, death and transmission to others.    Plan  1.  Continue entecavir  2.  Abd U/S  3.  EGD in 2023  4.  Follow-up in 6 months    Julio Young MD  Hepatology  [facility]

## 2022-04-26 ENCOUNTER — ANCILLARY PROCEDURE (OUTPATIENT)
Dept: ULTRASOUND IMAGING | Facility: CLINIC | Age: 62
End: 2022-04-26
Attending: INTERNAL MEDICINE
Payer: COMMERCIAL

## 2022-04-26 DIAGNOSIS — K74.60 CHRONIC HEPATITIS B WITH CIRRHOSIS (H): ICD-10-CM

## 2022-04-26 DIAGNOSIS — B18.1 CHRONIC HEPATITIS B WITH CIRRHOSIS (H): ICD-10-CM

## 2022-04-26 PROCEDURE — 76700 US EXAM ABDOM COMPLETE: CPT | Performed by: RADIOLOGY

## 2022-07-05 DIAGNOSIS — B18.1 CHRONIC HEPATITIS B WITH CIRRHOSIS (H): ICD-10-CM

## 2022-07-05 DIAGNOSIS — K74.60 CHRONIC HEPATITIS B WITH CIRRHOSIS (H): ICD-10-CM

## 2022-07-05 RX ORDER — ENTECAVIR 0.5 MG/1
TABLET, FILM COATED ORAL
Qty: 60 TABLET | Refills: 11 | Status: SHIPPED | OUTPATIENT
Start: 2022-07-05 | End: 2023-01-24

## 2022-11-20 ENCOUNTER — HEALTH MAINTENANCE LETTER (OUTPATIENT)
Age: 62
End: 2022-11-20

## 2022-12-05 ENCOUNTER — TELEPHONE (OUTPATIENT)
Dept: GASTROENTEROLOGY | Facility: CLINIC | Age: 62
End: 2022-12-05

## 2022-12-05 DIAGNOSIS — K74.60 CHRONIC HEPATITIS B WITH CIRRHOSIS (H): Primary | ICD-10-CM

## 2022-12-05 DIAGNOSIS — B18.1 CHRONIC HEPATITIS B WITH CIRRHOSIS (H): Primary | ICD-10-CM

## 2022-12-05 NOTE — TELEPHONE ENCOUNTER
[facility] Call Center    Phone Message    May a detailed message be left on voicemail: yes     Reason for Call: Other: Patient is wondering when Dr. Young would like to see him next. Please call patient back at [phone] Thank you :)     Action Taken: Message routed to:  [facility] HEP    Travel Screening: Not Applicable

## 2022-12-16 ENCOUNTER — LAB (OUTPATIENT)
Dept: LAB | Facility: CLINIC | Age: 62
End: 2022-12-16
Payer: COMMERCIAL

## 2022-12-16 DIAGNOSIS — B18.1 CHRONIC HEPATITIS B WITH CIRRHOSIS (H): ICD-10-CM

## 2022-12-16 DIAGNOSIS — K74.60 CHRONIC HEPATITIS B WITH CIRRHOSIS (H): ICD-10-CM

## 2022-12-16 LAB
ALBUMIN SERPL-MCNC: 3.7 G/DL (ref 3.4–5)
ALP SERPL-CCNC: 68 U/L (ref 40–150)
ALT SERPL W P-5'-P-CCNC: 29 U/L (ref 0–70)
ANION GAP SERPL CALCULATED.3IONS-SCNC: 4 MMOL/L (ref 3–14)
AST SERPL W P-5'-P-CCNC: 23 U/L (ref 0–45)
BILIRUB DIRECT SERPL-MCNC: 0.2 MG/DL (ref 0–0.2)
BILIRUB SERPL-MCNC: 1.1 MG/DL (ref 0.2–1.3)
BUN SERPL-MCNC: 24 MG/DL (ref 7–30)
CALCIUM SERPL-MCNC: 9 MG/DL (ref 8.5–10.1)
CHLORIDE BLD-SCNC: 108 MMOL/L (ref 94–109)
CO2 SERPL-SCNC: 28 MMOL/L (ref 20–32)
CREAT SERPL-MCNC: 1.06 MG/DL (ref 0.66–1.25)
ERYTHROCYTE [DISTWIDTH] IN BLOOD BY AUTOMATED COUNT: 12.4 % (ref 10–15)
GFR SERPL CREATININE-BSD FRML MDRD: 79 ML/MIN/1.73M2
GLUCOSE BLD-MCNC: 120 MG/DL (ref 70–99)
HCT VFR BLD AUTO: 46 % (ref 40–53)
HGB BLD-MCNC: 15.6 G/DL (ref 13.3–17.7)
INR PPP: 1.04 (ref 0.85–1.15)
MCH RBC QN AUTO: 29.9 PG (ref 26.5–33)
MCHC RBC AUTO-ENTMCNC: 33.9 G/DL (ref 31.5–36.5)
MCV RBC AUTO: 88 FL (ref 78–100)
PLATELET # BLD AUTO: 122 10E3/UL (ref 150–450)
POTASSIUM BLD-SCNC: 4.1 MMOL/L (ref 3.4–5.3)
PROT SERPL-MCNC: 6.9 G/DL (ref 6.8–8.8)
RBC # BLD AUTO: 5.21 10E6/UL (ref 4.4–5.9)
SODIUM SERPL-SCNC: 140 MMOL/L (ref 133–144)
WBC # BLD AUTO: 4.2 10E3/UL (ref 4–11)

## 2022-12-16 PROCEDURE — 85027 COMPLETE CBC AUTOMATED: CPT

## 2022-12-16 PROCEDURE — 87517 HEPATITIS B DNA QUANT: CPT

## 2022-12-16 PROCEDURE — 36415 COLL VENOUS BLD VENIPUNCTURE: CPT

## 2022-12-16 PROCEDURE — 80053 COMPREHEN METABOLIC PANEL: CPT

## 2022-12-16 PROCEDURE — 85610 PROTHROMBIN TIME: CPT

## 2022-12-16 PROCEDURE — 82248 BILIRUBIN DIRECT: CPT

## 2022-12-20 LAB — HBV DNA SERPL NAA+PROBE-ACNC: NOT DETECTED IU/ML

## 2022-12-21 ENCOUNTER — TELEPHONE (OUTPATIENT)
Dept: GASTROENTEROLOGY | Facility: CLINIC | Age: 62
End: 2022-12-21

## 2022-12-21 NOTE — TELEPHONE ENCOUNTER
[facility] Call Center    Phone Message    May a detailed message be left on voicemail: yes     Reason for Call: Medication Question or concern regarding medication   Prescription Clarification  Name of Medication: entecavir (BARACLUDE) 0.5 MG tablet  Prescribing Provider: Dr. Julio Beltran   Pharmacy: [facility]   What on the order needs clarification? Pharmacy needs the ICD-10 code    Action Taken: Message routed to:  [facility]    Travel Screening: Not Applicable

## 2022-12-21 NOTE — TELEPHONE ENCOUNTER
Called pharmacy back and clarified ICD-10 code.    No further questions or concerns.    Flora MIRANDA LPN  Hepatology Clinic

## 2022-12-28 ENCOUNTER — ANCILLARY PROCEDURE (OUTPATIENT)
Dept: ULTRASOUND IMAGING | Facility: CLINIC | Age: 62
End: 2022-12-28
Attending: INTERNAL MEDICINE
Payer: COMMERCIAL

## 2022-12-28 DIAGNOSIS — B18.1 CHRONIC HEPATITIS B WITH CIRRHOSIS (H): ICD-10-CM

## 2022-12-28 DIAGNOSIS — K74.60 CHRONIC HEPATITIS B WITH CIRRHOSIS (H): ICD-10-CM

## 2022-12-28 PROCEDURE — 76700 US EXAM ABDOM COMPLETE: CPT | Mod: TC | Performed by: RADIOLOGY

## 2023-01-24 ENCOUNTER — TELEPHONE (OUTPATIENT)
Dept: GASTROENTEROLOGY | Facility: CLINIC | Age: 63
End: 2023-01-24
Payer: COMMERCIAL

## 2023-01-24 DIAGNOSIS — K74.60 CHRONIC HEPATITIS B WITH CIRRHOSIS (H): ICD-10-CM

## 2023-01-24 DIAGNOSIS — B18.1 CHRONIC HEPATITIS B WITH CIRRHOSIS (H): ICD-10-CM

## 2023-01-24 RX ORDER — ENTECAVIR 0.5 MG/1
TABLET, FILM COATED ORAL
Qty: 60 TABLET | Refills: 11 | Status: CANCELLED | OUTPATIENT
Start: 2023-01-24

## 2023-01-24 RX ORDER — ENTECAVIR 0.5 MG/1
TABLET, FILM COATED ORAL
Qty: 60 TABLET | Refills: 11 | Status: SHIPPED | OUTPATIENT
Start: 2023-01-24 | End: 2023-01-24

## 2023-01-24 RX ORDER — ENTECAVIR 0.5 MG/1
TABLET, FILM COATED ORAL
Qty: 60 TABLET | Refills: 11 | Status: SHIPPED | OUTPATIENT
Start: 2023-01-24 | End: 2024-01-25

## 2023-01-24 NOTE — TELEPHONE ENCOUNTER
Patient called in again and states he needs the medication sent to [facility]  [address] [place] [address]       [facility] Call Center    Phone Message    May a detailed message be left on voicemail: yes     Reason for Call: Medication Question or concern regarding medication   Prescription Clarification  Name of Medication: entecavir (BARACLUDE) 0.5 MG tablet  Prescribing Provider: Julio Barajas MD Pharmacy: [facility] - [address] [place] [address] NO   What on the order needs clarification?     Pt is requesting a call back to discuss the difficulty getting their medication and how to proceed.      [phone]- [place] pharmacy (pt asked writer to include this number)            Action Taken: Message routed to:  [facility]    Travel Screening: Not Applicable

## 2023-01-26 ENCOUNTER — TELEPHONE (OUTPATIENT)
Dept: GASTROENTEROLOGY | Facility: CLINIC | Age: 63
End: 2023-01-26
Payer: COMMERCIAL

## 2023-01-26 NOTE — TELEPHONE ENCOUNTER
Tried calling patient to discuss, no answer.  [facility] pharmacy in [place] does not carry entecavir so sent prescription to [facility].      Flora MIRANDA LPN  Hepatology Clinic     [facility]    Phone Message    May a detailed message be left on voicemail: yes     Reason for Call: Medication Question or concern regarding medication   Prescription Clarification  Name of Medication: entecavir (BARACLUDE) 0.5 MG tablet  Prescribing Provider: Dr. Young   Pharmacy: [facility]  [address] [place] [address]     What on the order needs clarification?  Patient needs the medication resent to the above pharmacy please. Thank you.          Action Taken: Message routed to:  [facility] HEP    Travel Screening: Not Applicable

## 2023-02-20 ENCOUNTER — VIRTUAL VISIT (OUTPATIENT)
Dept: GASTROENTEROLOGY | Facility: CLINIC | Age: 63
End: 2023-02-20
Attending: INTERNAL MEDICINE
Payer: COMMERCIAL

## 2023-02-20 ENCOUNTER — TELEPHONE (OUTPATIENT)
Dept: GASTROENTEROLOGY | Facility: CLINIC | Age: 63
End: 2023-02-20
Payer: COMMERCIAL

## 2023-02-20 ENCOUNTER — PATIENT OUTREACH (OUTPATIENT)
Dept: CARE COORDINATION | Facility: CLINIC | Age: 63
End: 2023-02-20

## 2023-02-20 VITALS — BODY MASS INDEX: 21.14 KG/M2 | WEIGHT: 135 LBS

## 2023-02-20 DIAGNOSIS — K74.60 CHRONIC HEPATITIS B WITH CIRRHOSIS (H): Primary | ICD-10-CM

## 2023-02-20 DIAGNOSIS — B18.1 CHRONIC HEPATITIS B WITH CIRRHOSIS (H): Primary | ICD-10-CM

## 2023-02-20 PROCEDURE — G0463 HOSPITAL OUTPT CLINIC VISIT: HCPCS | Mod: GC,PN,GT | Performed by: INTERNAL MEDICINE

## 2023-02-20 PROCEDURE — 99214 OFFICE O/P EST MOD 30 MIN: CPT | Mod: GC | Performed by: INTERNAL MEDICINE

## 2023-02-20 ASSESSMENT — PAIN SCALES - GENERAL: PAINLEVEL: NO PAIN (0)

## 2023-02-20 NOTE — PROGRESS NOTES
[facility] Hepatology    Follow-up Visit    Follow-up visit for HBV related cirrhosis    Subjective:  62 year old male    Cirrhosis  - dx 2015  - HBV  - hx ascites, resolved with antiviral therapy  - no hx variceal bleed   - no hx HE  - EGD Jun 2021- small EV, moderate portal hypertensive gastropathy  - HCC screening- abd U/S 12/2022    HBV  - dx 2015  - GT-C  - eAg negative, eAb positive  - on entecavir since 2015  - HBV DNA undetectable, December 2022    Last visit March 2022 - virtual visit. No new medications, ER visits or hospital admissions since last visit.    Reports doing well today without concerns or complaints.     Denies any symptoms related to liver disease. Reports abdominal bloating at times, but denies jaundice, lower extremity edema, abdominal distension, lethargy or confusion. Denies melena, hematochezia or hematemesis.     Stable weight, around 135lb. Reports good appetite.     He is adherent to his entecavir; denies any issues with cost. Reports some concerns with insurance and is interested in talking with a .     Received the COVID-19 bivalent booster in the fall of 2022.      Denies alcohol or tobacco use. Lives with wife, daughter, niece and nephew. Has not been working since June 2020.     Medical hx Surgical hx   Past Medical History:   Diagnosis Date     Chronic hepatitis B (H)      Cirrhosis (H)      Perforation of tympanic membrane, unspecified     Right, chronic, from remote infection     Pupil irregular of right eye 2012    traumatic, from tennis ball      Past Surgical History:   Procedure Laterality Date     COLONOSCOPY N/A 10/24/2019    Procedure: COLONOSCOPY, WITH POLYPECTOMY AND BIOPSY;  Surgeon: Julio Barajas MD;  Location:  GI     ESOPHAGOSCOPY, GASTROSCOPY, DUODENOSCOPY (EGD), COMBINED N/A 1/6/2016    Procedure: COMBINED ESOPHAGOSCOPY, GASTROSCOPY, DUODENOSCOPY (EGD);  Surgeon: Jordan Wilson MD;  Location:  GI     ESOPHAGOSCOPY,  GASTROSCOPY, DUODENOSCOPY (EGD), COMBINED N/A 8/16/2018    Procedure: COMBINED ESOPHAGOSCOPY, GASTROSCOPY, DUODENOSCOPY (EGD);  EGD;  Surgeon: Julio Barajas MD;  Location:  GI     ESOPHAGOSCOPY, GASTROSCOPY, DUODENOSCOPY (EGD), COMBINED N/A 6/3/2021    Procedure: ESOPHAGOGASTRODUODENOSCOPY (EGD);  Surgeon: Julio Barajas MD;  Location:  GI     NO HISTORY OF SURGERY      that he can recall          Medications  Current Outpatient Medications   Medication Instructions     entecavir (BARACLUDE) 0.5 MG tablet TAKE 2 TABLETS BY MOUTH 1 TIME A DAY ON AN EMPTY STOMACH     Denies OTC medications or supplements    Allergies  No Known Allergies    Review of systems  A 10-point review of systems was negative    Examination  Gen- well, NAD, normal color  Eye- EOMI, no conjunctival icterus  Resp-breathing comfortably on ambient air  Skin- no rash or jaundice  Psych- normal mood  Neuro- A&Ox3    Laboratory  BMPRecent Labs   Lab Test 12/16/22  0903 03/17/22  1011 04/11/21  0949 02/19/20  0801    140 138 140   POTASSIUM 4.1 4.4 4.3 4.2   CHLORIDE 108 108 107 108   MARLEN 9.0 9.0 9.0 9.3   CO2 28 29 31 28   BUN 24 16 21 18   CR 1.06 1.00 1.00 0.97   * 101* 94 95     CBC  Recent Labs   Lab Test 12/16/22  0903 03/17/22  1011 03/17/22  1011 04/11/21  0949 02/19/20  0801   WBC 4.2  --  3.4* 3.4* 4.0   RBC 5.21  --  5.34 5.52 5.70   HGB 15.6   < > 16.2 16.4 17.0   HCT 46.0  --  46.9 48.5 50.2   MCV 88  --  88 88 88   MCH 29.9  --  30.3 29.7 29.8   MCHC 33.9  --  34.5 33.8 33.9   RDW 12.4  --  12.1 12.4 12.2   *  --  115* 128* 115*    < > = values in this interval not displayed.     Liver Enzymes   Recent Labs   Lab Test 12/16/22  0903   PROTTOTAL 6.9   ALBUMIN 3.7   BILITOTAL 1.1   ALKPHOS 68   AST 23   ALT 29      INR   INR   Date Value Ref Range Status   12/16/2022 1.04 0.85 - 1.15 Final   04/11/2021 0.95 0.86 - 1.14 Final        Radiology  EGD Jun 2021 personally reviewed- small  varices    Assessment  61 y/o M with PMHx of decompensated HBV related cirrhosis that re-compensated in the setting of antiviral medication use; previously had ascites. MELD-Na: 8 (stable). Patient adherent to entecavir.  Patient with no evidence of ascites, hepatic encephalopathy or history of esophageal variceal bleeding.  Up-to-date with HCC screening.    We discussed the importance of antiviral therapy adherence for his hepatitis B as well as variceal screening.  He has received his COVID-19 bivalent booster.    Plan  1. Continue entecavir   2. HCC screening with abdominal US q6 months, due 6/2023  3. EGD in 2023  4. Social work consult given insurance concerns    RTC: 6 months - in person    Discussed with Dr. Young, transplant hepatologist.     Candice Cramer MD (Lizzie)  Gastroenterology/Hepatology Fellow  [phone]    ----------------------------   Jessie Joe is a 62 year old male who is being evaluated via a billable video visit.      Is the patient currently in the state of [place]? YES    Visit mode:VIDEO    If the visit is dropped, the patient can be reconnected by: VIDEO VISIT: Text to cell phone: [phone]    Will anyone else be joining the visit? NO      How would you like to obtain your AVS? MyChart    Are changes needed to the allergy or medication list? NO    Comments or concerns regarding today's visit: None    Video-Visit Details    Type of service:  Video Visit    Video Start Time (time video started): 1:30PM    Video End Time (time video stopped): 1:53PM    Originating Location (pt. Location): Home    Distant Location (provider location):  On-site    Mode of Communication:  Video Conference via [employer]

## 2023-02-20 NOTE — LETTER
2/20/2023         RE: Jessie Diaz  [address]  [place] [address]        Dear Colleague,    Thank you for referring your patient, Jessie Diaz, to the [facility] [place]. Please see a copy of my visit note below.    [facility] Hepatology    Follow-up Visit    Follow-up visit for HBV related cirrhosis    Subjective:  62 year old male    Cirrhosis  - dx 2015  - HBV  - hx ascites, resolved with antiviral therapy  - no hx variceal bleed   - no hx HE  - EGD Jun 2021- small EV, moderate portal hypertensive gastropathy  - HCC screening- abd U/S 12/2022    HBV  - dx 2015  - GT-C  - eAg negative, eAb positive  - on entecavir since 2015  - HBV DNA undetectable, December 2022    Last visit March 2022 - virtual visit. No new medications, ER visits or hospital admissions since last visit.    Reports doing well today without concerns or complaints.     Denies any symptoms related to liver disease. Reports abdominal bloating at times, but denies jaundice, lower extremity edema, abdominal distension, lethargy or confusion. Denies melena, hematochezia or hematemesis.     Stable weight, around 135lb. Reports good appetite.     He is adherent to his entecavir; denies any issues with cost. Reports some concerns with insurance and is interested in talking with a .     Received the COVID-19 bivalent booster in the fall of 2022.      Denies alcohol or tobacco use. Lives with wife, daughter, niece and nephew. Has not been working since June 2020.     Medical hx Surgical hx   Past Medical History:   Diagnosis Date     Chronic hepatitis B (H)      Cirrhosis (H)      Perforation of tympanic membrane, unspecified     Right, chronic, from remote infection     Pupil irregular of right eye 2012    traumatic, from tennis ball      Past Surgical History:   Procedure Laterality Date     COLONOSCOPY N/A 10/24/2019    Procedure: COLONOSCOPY, WITH POLYPECTOMY AND BIOPSY;   Surgeon: Julio Barajas MD;  Location:  GI     ESOPHAGOSCOPY, GASTROSCOPY, DUODENOSCOPY (EGD), COMBINED N/A 1/6/2016    Procedure: COMBINED ESOPHAGOSCOPY, GASTROSCOPY, DUODENOSCOPY (EGD);  Surgeon: Jordan Wilson MD;  Location:  GI     ESOPHAGOSCOPY, GASTROSCOPY, DUODENOSCOPY (EGD), COMBINED N/A 8/16/2018    Procedure: COMBINED ESOPHAGOSCOPY, GASTROSCOPY, DUODENOSCOPY (EGD);  EGD;  Surgeon: Julio Barajas MD;  Location:  GI     ESOPHAGOSCOPY, GASTROSCOPY, DUODENOSCOPY (EGD), COMBINED N/A 6/3/2021    Procedure: ESOPHAGOGASTRODUODENOSCOPY (EGD);  Surgeon: Julio Barajas MD;  Location:  GI     NO HISTORY OF SURGERY      that he can recall          Medications  Current Outpatient Medications   Medication Instructions     entecavir (BARACLUDE) 0.5 MG tablet TAKE 2 TABLETS BY MOUTH 1 TIME A DAY ON AN EMPTY STOMACH     Denies OTC medications or supplements    Allergies  No Known Allergies    Review of systems  A 10-point review of systems was negative    Examination  Gen- well, NAD, normal color  Eye- EOMI, no conjunctival icterus  Resp-breathing comfortably on ambient air  Skin- no rash or jaundice  Psych- normal mood  Neuro- A&Ox3    Laboratory  BMPRecent Labs   Lab Test 12/16/22  0903 03/17/22  1011 04/11/21  0949 02/19/20  0801    140 138 140   POTASSIUM 4.1 4.4 4.3 4.2   CHLORIDE 108 108 107 108   MARLEN 9.0 9.0 9.0 9.3   CO2 28 29 31 28   BUN 24 16 21 18   CR 1.06 1.00 1.00 0.97   * 101* 94 95     CBC  Recent Labs   Lab Test 12/16/22  0903 03/17/22  1011 03/17/22  1011 04/11/21  0949 02/19/20  0801   WBC 4.2  --  3.4* 3.4* 4.0   RBC 5.21  --  5.34 5.52 5.70   HGB 15.6   < > 16.2 16.4 17.0   HCT 46.0  --  46.9 48.5 50.2   MCV 88  --  88 88 88   MCH 29.9  --  30.3 29.7 29.8   MCHC 33.9  --  34.5 33.8 33.9   RDW 12.4  --  12.1 12.4 12.2   *  --  115* 128* 115*    < > = values in this interval not displayed.     Liver Enzymes   Recent Labs   Lab Test  12/16/22  0903   PROTTOTAL 6.9   ALBUMIN 3.7   BILITOTAL 1.1   ALKPHOS 68   AST 23   ALT 29      INR   INR   Date Value Ref Range Status   12/16/2022 1.04 0.85 - 1.15 Final   04/11/2021 0.95 0.86 - 1.14 Final        Radiology  EGD Jun 2021 personally reviewed- small varices    Assessment  61 y/o M with PMHx of decompensated HBV related cirrhosis that re-compensated in the setting of antiviral medication use; previously had ascites. MELD-Na: 8 (stable). Patient adherent to entecavir.  Patient with no evidence of ascites, hepatic encephalopathy or history of esophageal variceal bleeding.  Up-to-date with HCC screening.    We discussed the importance of antiviral therapy adherence for his hepatitis B as well as variceal screening.  He has received his COVID-19 bivalent booster.    Plan  1. Continue entecavir   2. HCC screening with abdominal US q6 months, due 6/2023  3. EGD in 2023  4. Social work consult given insurance concerns    RTC: 6 months - in person    Discussed with Dr. Young, transplant hepatologist.     Candice Cramer MD (Lizzie)  Gastroenterology/Hepatology Fellow  [phone]    ----------------------------   Jessie Joe is a 62 year old male who is being evaluated via a billable video visit.      Is the patient currently in the state of [place]? YES    Visit mode:VIDEO    If the visit is dropped, the patient can be reconnected by: VIDEO VISIT: Text to cell phone: [phone]    Will anyone else be joining the visit? NO      How would you like to obtain your AVS? MyChart    Are changes needed to the allergy or medication list? NO    Comments or concerns regarding today's visit: None    Video-Visit Details    Type of service:  Video Visit    Video Start Time (time video started): 1:30PM    Video End Time (time video stopped): 1:53PM    Originating Location (pt. Location): Home    Distant Location (provider location):  On-site    Mode of Communication:  Video Conference via  [facility]          Attestation signed by Julio Barajas MD at 2/20/2023  3:24 PM:  Physician Attestation  I, Julio Young, saw this patient with the fellow and agree with the fellow s findings and plan of care as documented in the fellow s note.  I personally reviewed vital signs, medications, labs, and imaging.    We discussed the ongoing need for antiviral therapy for HBV.      Julio Young  Date of Service (when I saw the patient): 02/20/23    I spent 30 minutes on the date of the encounter doing chart review, history and exam, documentation and further activities as noted above.      Again, thank you for allowing me to participate in the care of your patient.        Sincerely,        Julio Young MD

## 2023-02-20 NOTE — PROGRESS NOTES
Social Work Intervention  [facility] [place]    Data/Intervention:    Patient Name:  Jessie Diaz  /Age:  1960 (62 year old)    Visit Type: telephone  Referral Source: Hepatology  Reason for Referral:  Insurance questions, transportation    Collaborated With:    -Jessie- [phone]  -Mikel with [employer]    Psychosocial Information/Concerns:  Referral received indicating that Jessie needs assistance with insurance and transportation.     Intervention/Education/Resources Provided:  I contacted Jessie and he discussed wanting to know if his clinic bill will be covered by his insurance, and eluded to wondering if his PCP needed to provide a referral for him to see a specialist here. Jessie also asked if his insurance would pay for a hospital stay. I had to explain a few times that I do not do billing or verify insurance for appointments and explained that the Financial Counselors do this. I explained I will contact the Financial Counseling team and ask that they call Jessie to discuss his questions.   Jessie then discussed how he is waiting to turn 64 to get Medicare and asked what plan would be best for him, one that is offered through an employer or Medicare. I explained that it would depend on what plan were offered by the employer and I advised that he work with an insurance navigator at that time to get direction on what would be best. I also explained that once Jessie turns 64 he wouldn't be automatically enrolled in Medicare, but would need to enroll.     I noted seeing that Jessie had questions about transportation and explained that he can get ride benefits through his [facility] [place]. Jessie said he is not comfortable with rides that way and his son transports him to appointments.     Jessie did not have any further SW questions or needs at this time.     I contacted Mikel Ferguson with [employer] asking for assistance and he  indicated he will call [facility].     Assessment/Plan:  No follow up is planned on my part.     TASHIA Hill, [facility]    [facility]  Ph: [phone], Pgr: [phone]  2/20/2023

## 2023-03-17 ENCOUNTER — TELEPHONE (OUTPATIENT)
Dept: GASTROENTEROLOGY | Facility: CLINIC | Age: 63
End: 2023-03-17
Payer: COMMERCIAL

## 2023-04-15 ENCOUNTER — HEALTH MAINTENANCE LETTER (OUTPATIENT)
Age: 63
End: 2023-04-15

## 2023-06-30 ENCOUNTER — TELEPHONE (OUTPATIENT)
Dept: GASTROENTEROLOGY | Facility: CLINIC | Age: 63
End: 2023-06-30
Payer: COMMERCIAL

## 2023-09-15 ENCOUNTER — TELEPHONE (OUTPATIENT)
Dept: GASTROENTEROLOGY | Facility: CLINIC | Age: 63
End: 2023-09-15
Payer: COMMERCIAL

## 2023-09-19 ENCOUNTER — TELEPHONE (OUTPATIENT)
Dept: GASTROENTEROLOGY | Facility: CLINIC | Age: 63
End: 2023-09-19
Payer: COMMERCIAL

## 2023-09-19 DIAGNOSIS — K74.60 CHRONIC HEPATITIS B WITH CIRRHOSIS (H): Primary | ICD-10-CM

## 2023-09-19 DIAGNOSIS — B18.1 CHRONIC HEPATITIS B WITH CIRRHOSIS (H): Primary | ICD-10-CM

## 2023-09-19 NOTE — TELEPHONE ENCOUNTER
pt needs to confirm w son; reschedule 9.26 appts to 9.27 as follows: 11am US, 12pm labs, 1pm appt- KB 9.19.23//

## 2023-09-19 NOTE — TELEPHONE ENCOUNTER
[facility]    Phone Message    May a detailed message be left on voicemail: yes     Reason for Call: Order(s): Other:   Reason for requested: Hepatology Appt labs  Date needed: prior to lab appt on 09/21/2023  Provider name: Julio MIX    Action Taken: Message routed to:  [facility] ERJI Hep    Travel Screening: Not Applicable

## 2023-09-21 ENCOUNTER — LAB (OUTPATIENT)
Dept: LAB | Facility: CLINIC | Age: 63
End: 2023-09-21
Payer: COMMERCIAL

## 2023-09-21 DIAGNOSIS — K74.60 CHRONIC HEPATITIS B WITH CIRRHOSIS (H): ICD-10-CM

## 2023-09-21 DIAGNOSIS — B18.1 CHRONIC HEPATITIS B WITH CIRRHOSIS (H): ICD-10-CM

## 2023-09-21 LAB
ALBUMIN SERPL BCG-MCNC: 4.2 G/DL (ref 3.5–5.2)
ALP SERPL-CCNC: 68 U/L (ref 40–129)
ALT SERPL W P-5'-P-CCNC: 31 U/L (ref 0–70)
ANION GAP SERPL CALCULATED.3IONS-SCNC: 9 MMOL/L (ref 7–15)
AST SERPL W P-5'-P-CCNC: 33 U/L (ref 0–45)
BILIRUB DIRECT SERPL-MCNC: 0.21 MG/DL (ref 0–0.3)
BILIRUB SERPL-MCNC: 0.9 MG/DL
BUN SERPL-MCNC: 24.9 MG/DL (ref 8–23)
CALCIUM SERPL-MCNC: 9.1 MG/DL (ref 8.8–10.2)
CHLORIDE SERPL-SCNC: 103 MMOL/L (ref 98–107)
CREAT SERPL-MCNC: 1.1 MG/DL (ref 0.67–1.17)
DEPRECATED HCO3 PLAS-SCNC: 26 MMOL/L (ref 22–29)
EGFRCR SERPLBLD CKD-EPI 2021: 75 ML/MIN/1.73M2
ERYTHROCYTE [DISTWIDTH] IN BLOOD BY AUTOMATED COUNT: 12.3 % (ref 10–15)
GLUCOSE SERPL-MCNC: 100 MG/DL (ref 70–99)
HCT VFR BLD AUTO: 46.7 % (ref 40–53)
HGB BLD-MCNC: 15.8 G/DL (ref 13.3–17.7)
INR PPP: 1.03 (ref 0.85–1.15)
MCH RBC QN AUTO: 30.2 PG (ref 26.5–33)
MCHC RBC AUTO-ENTMCNC: 33.8 G/DL (ref 31.5–36.5)
MCV RBC AUTO: 89 FL (ref 78–100)
PLATELET # BLD AUTO: 102 10E3/UL (ref 150–450)
POTASSIUM SERPL-SCNC: 4.6 MMOL/L (ref 3.4–5.3)
PROT SERPL-MCNC: 6.9 G/DL (ref 6.4–8.3)
RBC # BLD AUTO: 5.24 10E6/UL (ref 4.4–5.9)
SODIUM SERPL-SCNC: 138 MMOL/L (ref 136–145)
WBC # BLD AUTO: 4 10E3/UL (ref 4–11)

## 2023-09-21 PROCEDURE — 36415 COLL VENOUS BLD VENIPUNCTURE: CPT

## 2023-09-21 PROCEDURE — 82248 BILIRUBIN DIRECT: CPT

## 2023-09-21 PROCEDURE — 85610 PROTHROMBIN TIME: CPT

## 2023-09-21 PROCEDURE — 80053 COMPREHEN METABOLIC PANEL: CPT

## 2023-09-21 PROCEDURE — 85027 COMPLETE CBC AUTOMATED: CPT

## 2023-09-21 PROCEDURE — 87517 HEPATITIS B DNA QUANT: CPT

## 2023-09-23 LAB — HBV DNA SERPL NAA+PROBE-ACNC: NOT DETECTED IU/ML

## 2023-09-26 ENCOUNTER — ANCILLARY PROCEDURE (OUTPATIENT)
Dept: ULTRASOUND IMAGING | Facility: CLINIC | Age: 63
End: 2023-09-26
Attending: INTERNAL MEDICINE
Payer: COMMERCIAL

## 2023-09-26 DIAGNOSIS — K74.60 CHRONIC HEPATITIS B WITH CIRRHOSIS (H): ICD-10-CM

## 2023-09-26 DIAGNOSIS — B18.1 CHRONIC HEPATITIS B WITH CIRRHOSIS (H): ICD-10-CM

## 2023-09-26 PROCEDURE — 76700 US EXAM ABDOM COMPLETE: CPT | Mod: TC | Performed by: RADIOLOGY

## 2023-09-27 ENCOUNTER — OFFICE VISIT (OUTPATIENT)
Dept: GASTROENTEROLOGY | Facility: CLINIC | Age: 63
End: 2023-09-27
Attending: INTERNAL MEDICINE
Payer: COMMERCIAL

## 2023-09-27 VITALS
DIASTOLIC BLOOD PRESSURE: 75 MMHG | HEART RATE: 60 BPM | TEMPERATURE: 97.5 F | BODY MASS INDEX: 19.01 KG/M2 | OXYGEN SATURATION: 96 % | WEIGHT: 121.1 LBS | HEIGHT: 67 IN | SYSTOLIC BLOOD PRESSURE: 117 MMHG | RESPIRATION RATE: 16 BRPM

## 2023-09-27 DIAGNOSIS — B18.1 CHRONIC HEPATITIS B WITH CIRRHOSIS (H): ICD-10-CM

## 2023-09-27 DIAGNOSIS — K74.60 CHRONIC HEPATITIS B WITH CIRRHOSIS (H): ICD-10-CM

## 2023-09-27 PROCEDURE — 99214 OFFICE O/P EST MOD 30 MIN: CPT | Performed by: INTERNAL MEDICINE

## 2023-09-27 PROCEDURE — G0463 HOSPITAL OUTPT CLINIC VISIT: HCPCS | Performed by: INTERNAL MEDICINE

## 2023-09-27 ASSESSMENT — PAIN SCALES - GENERAL: PAINLEVEL: NO PAIN (0)

## 2023-09-27 NOTE — PROGRESS NOTES
[facility] Hepatology    Follow-up Visit    Follow-up visit for cirrhosis    Subjective:  63 year old male    Cirrhosis  - dx 2015  - HBV  - hx ascites, resolved with antiviral therapy  - no hx variceal bleed   - no hx HE  - EGD Jun 2021- small EV, moderate portal hypertensive gastropathy  - HCC screening- abd U/S Sep 2023     HBV  - dx 2015  - GT-C  - eAg negative, eAb positive  - on entecavir since 2015  - HBV DNA undetectable, Sep 2023    Patient comes to clinic this morning with his son.    Last visit February 2023.  No new medications, ER visits or hospital admissions since last visit.    Patient is well today.  He has no symptoms related to liver disease.  He reports occasional mild headaches.    Patient denies rash, joint symptoms, jaundice, lower extremity edema, abdominal distension, lethargy or confusion.    Patient denies melena, hematemesis or hematochezia.    Patient denies fevers, sweats or chills.      Weight stable.  Appetite is normal.    Patient is adherent to his medications.  He again is asking if he really needs to be taking his medications.    Patient does not drink alcohol.      Medical hx Surgical hx   Past Medical History:   Diagnosis Date    Chronic hepatitis B (H)     Cirrhosis (H)     Perforation of tympanic membrane, unspecified     Right, chronic, from remote infection    Pupil irregular of right eye 2012    traumatic, from tennis ball      Past Surgical History:   Procedure Laterality Date    COLONOSCOPY N/A 10/24/2019    Procedure: COLONOSCOPY, WITH POLYPECTOMY AND BIOPSY;  Surgeon: Julio Barajas MD;  Location:  GI    ESOPHAGOSCOPY, GASTROSCOPY, DUODENOSCOPY (EGD), COMBINED N/A 1/6/2016    Procedure: COMBINED ESOPHAGOSCOPY, GASTROSCOPY, DUODENOSCOPY (EGD);  Surgeon: Jordan Wilson MD;  Location:  GI    ESOPHAGOSCOPY, GASTROSCOPY, DUODENOSCOPY (EGD), COMBINED N/A 8/16/2018    Procedure: COMBINED ESOPHAGOSCOPY, GASTROSCOPY, DUODENOSCOPY (EGD);  EGD;   "Surgeon: Julio Barajas MD;  Location: [facility] GI    ESOPHAGOSCOPY, GASTROSCOPY, DUODENOSCOPY (EGD), COMBINED N/A 6/3/2021    Procedure: ESOPHAGOGASTRODUODENOSCOPY (EGD);  Surgeon: Julio Barajas MD;  Location:  GI    NO HISTORY OF SURGERY      that he can recall          Medications  Prior to Admission medications    Medication Sig Start Date End Date Taking? Authorizing Provider   entecavir (BARACLUDE) 0.5 MG tablet TAKE 2 TABLETS BY MOUTH 1 TIME A DAY ON AN EMPTY STOMACH 1/24/23  Yes Julio Barajas MD       Allergies  No Known Allergies    Review of systems  A 10-point review of systems was negative    Examination  /75 (BP Location: Right arm, Patient Position: Sitting, Cuff Size: Adult Regular)   Pulse 60   Temp 97.5  F (36.4  C) (Oral)   Resp 16   Ht 1.702 m (5' 7.01\")   Wt 54.9 kg (121 lb 1.6 oz)   SpO2 96%   BMI 18.96 kg/m    Body mass index is 18.96 kg/m .    Gen- well, NAD, A+Ox3, normal color  CVS- RRR  RS- CTA  Abd- SNT, no ascites or organomegaly on palpation or percussion, BS+  Extr- hands normal, no YASMIN  Skin- no rash or jaundice  Neuro- no asterixis  Psych- normal mood    Laboratory  Lab Results   Component Value Date     09/21/2023     04/11/2021    POTASSIUM 4.6 09/21/2023    POTASSIUM 4.1 12/16/2022    POTASSIUM 4.3 04/11/2021    CHLORIDE 103 09/21/2023    CHLORIDE 108 12/16/2022    CHLORIDE 107 04/11/2021    CO2 26 09/21/2023    CO2 28 12/16/2022    CO2 31 04/11/2021    BUN 24.9 09/21/2023    BUN 24 12/16/2022    BUN 21 04/11/2021    CR 1.10 09/21/2023    CR 1.00 04/11/2021       Lab Results   Component Value Date    BILITOTAL 0.9 09/21/2023    BILITOTAL 1.0 04/11/2021    ALT 31 09/21/2023    ALT 32 04/11/2021    AST 33 09/21/2023    AST 24 04/11/2021    ALKPHOS 68 09/21/2023    ALKPHOS 79 04/11/2021       Lab Results   Component Value Date    ALBUMIN 4.2 09/21/2023    ALBUMIN 3.7 12/16/2022    ALBUMIN 4.0 04/11/2021    PROTTOTAL 6.9 09/21/2023    " PROTTOTAL 7.3 04/11/2021        Lab Results   Component Value Date    WBC 4.0 09/21/2023    WBC 3.4 04/11/2021    HGB 15.8 09/21/2023    HGB 16.4 04/11/2021    MCV 89 09/21/2023    MCV 88 04/11/2021     09/21/2023     04/11/2021       Lab Results   Component Value Date    INR 1.03 09/21/2023    INR 0.95 04/11/2021       Radiology  Abd U/S Sep 2023 reviewed    Assessment  63 year old male who presents for follow-up of history of decompensated HBV cirrhosis complicated with history of ascites.  MELD 3.0 = 7 (stable).  No evidence of ascites or hepatic encephalopathy.  Due now for upper endoscopy for surveillance of esophageal varices-patient would prefer to wait until he has Medicare insurance next year.  Up-to-date with HCC screening.    Plan  Continue entecavir  Low Na diet  EGD next year  Follow-up in 6 months (video visit)    Julio Young MD  Hepatology  [facility] spent 30 minutes on the date of the encounter doing chart review, history and exam, documentation and further activities as noted above.

## 2023-09-27 NOTE — NURSING NOTE
"Chief Complaint   Patient presents with    RECHECK     Hep B cirrhosis      Vital signs:  Temp: 97.5  F (36.4  C) Temp src: Oral BP: 117/75 Pulse: 60   Resp: 16 SpO2: 96 %     Height: 170.2 cm (5' 7.01\") Weight: 54.9 kg (121 lb 1.6 oz)  Estimated body mass index is 18.96 kg/m  as calculated from the following:    Height as of this encounter: 1.702 m (5' 7.01\").    Weight as of this encounter: 54.9 kg (121 lb 1.6 oz).      Nicole Mars, [facility]  9/27/2023 12:42 PM    "

## 2023-09-27 NOTE — LETTER
9/27/2023         RE: Jessie Diaz  [address]  [place] [address]        Dear Colleague,    Thank you for referring your patient, Jessie Diaz, to the [facility] [place]. Please see a copy of my visit note below.    [facility]    Follow-up Visit    Follow-up visit for cirrhosis    Subjective:  63 year old male    Cirrhosis  - dx 2015  - HBV  - hx ascites, resolved with antiviral therapy  - no hx variceal bleed   - no hx HE  - EGD Jun 2021- small EV, moderate portal hypertensive gastropathy  - HCC screening- abd U/S Sep 2023     HBV  - dx 2015  - GT-C  - eAg negative, eAb positive  - on entecavir since 2015  - HBV DNA undetectable, Sep 2023    Patient comes to clinic this morning with his son.    Last visit February 2023.  No new medications, ER visits or hospital admissions since last visit.    Patient is well today.  He has no symptoms related to liver disease.  He reports occasional mild headaches.    Patient denies rash, joint symptoms, jaundice, lower extremity edema, abdominal distension, lethargy or confusion.    Patient denies melena, hematemesis or hematochezia.    Patient denies fevers, sweats or chills.      Weight stable.  Appetite is normal.    Patient is adherent to his medications.  He again is asking if he really needs to be taking his medications.    Patient does not drink alcohol.      Medical hx Surgical hx   Past Medical History:   Diagnosis Date    Chronic hepatitis B (H)     Cirrhosis (H)     Perforation of tympanic membrane, unspecified     Right, chronic, from remote infection    Pupil irregular of right eye 2012    traumatic, from tennis ball      Past Surgical History:   Procedure Laterality Date    COLONOSCOPY N/A 10/24/2019    Procedure: COLONOSCOPY, WITH POLYPECTOMY AND BIOPSY;  Surgeon: Julio Barajas MD;  Location:  GI    ESOPHAGOSCOPY, GASTROSCOPY, DUODENOSCOPY (EGD), COMBINED N/A 1/6/2016     "Procedure: COMBINED ESOPHAGOSCOPY, GASTROSCOPY, DUODENOSCOPY (EGD);  Surgeon: Jordan Wilson MD;  Location:  GI    ESOPHAGOSCOPY, GASTROSCOPY, DUODENOSCOPY (EGD), COMBINED N/A 8/16/2018    Procedure: COMBINED ESOPHAGOSCOPY, GASTROSCOPY, DUODENOSCOPY (EGD);  EGD;  Surgeon: Julio Barajas MD;  Location:  GI    ESOPHAGOSCOPY, GASTROSCOPY, DUODENOSCOPY (EGD), COMBINED N/A 6/3/2021    Procedure: ESOPHAGOGASTRODUODENOSCOPY (EGD);  Surgeon: Julio Barajas MD;  Location:  GI    NO HISTORY OF SURGERY      that he can recall          Medications  Prior to Admission medications    Medication Sig Start Date End Date Taking? Authorizing Provider   entecavir (BARACLUDE) 0.5 MG tablet TAKE 2 TABLETS BY MOUTH 1 TIME A DAY ON AN EMPTY STOMACH 1/24/23  Yes Julio Barajas MD       Allergies  No Known Allergies    Review of systems  A 10-point review of systems was negative    Examination  /75 (BP Location: Right arm, Patient Position: Sitting, Cuff Size: Adult Regular)   Pulse 60   Temp 97.5  F (36.4  C) (Oral)   Resp 16   Ht 1.702 m (5' 7.01\")   Wt 54.9 kg (121 lb 1.6 oz)   SpO2 96%   BMI 18.96 kg/m    Body mass index is 18.96 kg/m .    Gen- well, NAD, A+Ox3, normal color  CVS- RRR  RS- CTA  Abd- SNT, no ascites or organomegaly on palpation or percussion, BS+  Extr- hands normal, no YASMIN  Skin- no rash or jaundice  Neuro- no asterixis  Psych- normal mood    Laboratory  Lab Results   Component Value Date     09/21/2023     04/11/2021    POTASSIUM 4.6 09/21/2023    POTASSIUM 4.1 12/16/2022    POTASSIUM 4.3 04/11/2021    CHLORIDE 103 09/21/2023    CHLORIDE 108 12/16/2022    CHLORIDE 107 04/11/2021    CO2 26 09/21/2023    CO2 28 12/16/2022    CO2 31 04/11/2021    BUN 24.9 09/21/2023    BUN 24 12/16/2022    BUN 21 04/11/2021    CR 1.10 09/21/2023    CR 1.00 04/11/2021       Lab Results   Component Value Date    BILITOTAL 0.9 09/21/2023    BILITOTAL 1.0 04/11/2021    ALT " 31 09/21/2023    ALT 32 04/11/2021    AST 33 09/21/2023    AST 24 04/11/2021    ALKPHOS 68 09/21/2023    ALKPHOS 79 04/11/2021       Lab Results   Component Value Date    ALBUMIN 4.2 09/21/2023    ALBUMIN 3.7 12/16/2022    ALBUMIN 4.0 04/11/2021    PROTTOTAL 6.9 09/21/2023    PROTTOTAL 7.3 04/11/2021        Lab Results   Component Value Date    WBC 4.0 09/21/2023    WBC 3.4 04/11/2021    HGB 15.8 09/21/2023    HGB 16.4 04/11/2021    MCV 89 09/21/2023    MCV 88 04/11/2021     09/21/2023     04/11/2021       Lab Results   Component Value Date    INR 1.03 09/21/2023    INR 0.95 04/11/2021       Radiology  Abd U/S Sep 2023 reviewed    Assessment  63 year old male who presents for follow-up of history of decompensated HBV cirrhosis complicated with history of ascites.  MELD 3.0 = 7 (stable).  No evidence of ascites or hepatic encephalopathy.  Due now for upper endoscopy for surveillance of esophageal varices-patient would prefer to wait until he has Medicare insurance next year.  Up-to-date with HCC screening.    Plan  Continue entecavir  Low Na diet  EGD next year  Follow-up in 6 months (video visit)    Julio Young MD  Hepatology  [facility]    I spent 30 minutes on the date of the encounter doing chart review, history and exam, documentation and further activities as noted above.

## 2024-01-25 DIAGNOSIS — B18.1 CHRONIC HEPATITIS B WITH CIRRHOSIS (H): ICD-10-CM

## 2024-01-25 DIAGNOSIS — K74.60 CHRONIC HEPATITIS B WITH CIRRHOSIS (H): ICD-10-CM

## 2024-01-25 RX ORDER — ENTECAVIR 0.5 MG/1
TABLET, FILM COATED ORAL
Qty: 60 TABLET | Refills: 5 | Status: SHIPPED | OUTPATIENT
Start: 2024-01-25

## 2024-04-02 ENCOUNTER — ANCILLARY PROCEDURE (OUTPATIENT)
Dept: ULTRASOUND IMAGING | Facility: CLINIC | Age: 64
End: 2024-04-02
Attending: INTERNAL MEDICINE
Payer: COMMERCIAL

## 2024-04-02 DIAGNOSIS — K74.60 CHRONIC HEPATITIS B WITH CIRRHOSIS (H): ICD-10-CM

## 2024-04-02 DIAGNOSIS — B18.1 CHRONIC HEPATITIS B WITH CIRRHOSIS (H): ICD-10-CM

## 2024-04-02 PROCEDURE — 76700 US EXAM ABDOM COMPLETE: CPT | Mod: TC | Performed by: RADIOLOGY

## 2024-08-31 ENCOUNTER — HEALTH MAINTENANCE LETTER (OUTPATIENT)
Age: 64
End: 2024-08-31

## 2024-09-03 DIAGNOSIS — K74.60 CHRONIC HEPATITIS B WITH CIRRHOSIS (H): Primary | ICD-10-CM

## 2024-09-03 DIAGNOSIS — B18.1 CHRONIC HEPATITIS B WITH CIRRHOSIS (H): Primary | ICD-10-CM

## 2024-09-03 RX ORDER — ENTECAVIR 1 MG/1
TABLET, FILM COATED ORAL
Qty: 90 TABLET | Refills: 0 | Status: SHIPPED | OUTPATIENT
Start: 2024-09-03

## 2024-12-03 ENCOUNTER — TELEPHONE (OUTPATIENT)
Dept: GASTROENTEROLOGY | Facility: CLINIC | Age: 64
End: 2024-12-03
Payer: COMMERCIAL

## 2024-12-03 DIAGNOSIS — B18.1 CHRONIC HEPATITIS B WITH CIRRHOSIS (H): ICD-10-CM

## 2024-12-03 DIAGNOSIS — K74.60 CHRONIC HEPATITIS B WITH CIRRHOSIS (H): ICD-10-CM

## 2024-12-03 RX ORDER — ENTECAVIR 0.5 MG/1
TABLET, FILM COATED ORAL
Qty: 60 TABLET | Refills: 1 | Status: SHIPPED | OUTPATIENT
Start: 2024-12-03

## 2024-12-03 NOTE — TELEPHONE ENCOUNTER
[facility] Call Center    Phone Message    May a detailed message be left on voicemail: yes     Reason for Call: Medication Refill Request    Has the patient contacted the pharmacy for the refill? Yes   Name of medication being requested: entecavir (BARACLUDE) 1 MG tablet   Provider who prescribed the medication: Dr. Young   Pharmacy: [facility] [address], [address]   Date medication is needed: ASAP   Pt is requesting a 3 month supply for the refill, he is also asking the team please give him a call to confirm. Please advise. Thank you!    Action Taken: Message routed to:  [facility] ([facility] Hepatology    Travel Screening: Not Applicable     Date of Service:

## 2024-12-03 NOTE — TELEPHONE ENCOUNTER
60 day supply sent as courtesy. Last hepatology visit 9/2023. No future appointment scheduled. Unable to leave . [employer] message sent.    BOSTON HuttonN, RN, PHN  Hepatology Clinic  [facility]  [facility]

## 2025-03-24 DIAGNOSIS — B18.1 CHRONIC HEPATITIS B WITH CIRRHOSIS (H): ICD-10-CM

## 2025-03-24 DIAGNOSIS — K74.60 CHRONIC HEPATITIS B WITH CIRRHOSIS (H): ICD-10-CM

## 2025-03-24 RX ORDER — ENTECAVIR 1 MG/1
TABLET, FILM COATED ORAL
Qty: 30 TABLET | Refills: 1 | OUTPATIENT
Start: 2025-03-24

## 2025-03-25 ENCOUNTER — TELEPHONE (OUTPATIENT)
Dept: GASTROENTEROLOGY | Facility: CLINIC | Age: 65
End: 2025-03-25
Payer: COMMERCIAL

## 2025-03-25 ENCOUNTER — MYC MEDICAL ADVICE (OUTPATIENT)
Dept: GASTROENTEROLOGY | Facility: CLINIC | Age: 65
End: 2025-03-25
Payer: COMMERCIAL

## 2025-03-25 DIAGNOSIS — B18.1 CHRONIC HEPATITIS B WITH CIRRHOSIS (H): ICD-10-CM

## 2025-03-25 DIAGNOSIS — K74.60 CHRONIC HEPATITIS B WITH CIRRHOSIS (H): ICD-10-CM

## 2025-03-25 RX ORDER — ENTECAVIR 0.5 MG/1
1 TABLET, FILM COATED ORAL DAILY
Qty: 60 TABLET | Refills: 0 | Status: SHIPPED | OUTPATIENT
Start: 2025-03-25

## 2025-03-25 NOTE — TELEPHONE ENCOUNTER
Labs, US and 30 day supply of medication sent. Patient updated.    BOSTON HuttonN, RN, PHN  Hepatology Clinic  [facility]  [facility]

## 2025-03-25 NOTE — TELEPHONE ENCOUNTER
Health Call Center    Phone Message    May a detailed message be left on voicemail: yes     Reason for Call: Other: Jennifer is calling in asking for a call back from his care team, as he is looking to determine whether an ultrasound will be needed in addition to labs, as well as whether he will be able to get any more of his medications until he can be seen in clinic on 4/17. Please call back as soon as possible to discuss.     Action Taken: Message routed to:  [facility] ([facility] Hep    Travel Screening: Not Applicable     Date of Service:

## 2025-03-27 NOTE — TELEPHONE ENCOUNTER
"Called patient as [employer] message was not read. Home # rang but did not prompt a VM and required \"remote access code\". Attempt home # twice. Attempted mobile #. VM left on mobile #.    MAGALYS Hutton, RN, N  Hepatology Clinic  [facility]  [facility]    "

## 2025-04-11 ENCOUNTER — ANCILLARY PROCEDURE (OUTPATIENT)
Dept: ULTRASOUND IMAGING | Facility: CLINIC | Age: 65
End: 2025-04-11
Attending: PHYSICIAN ASSISTANT
Payer: COMMERCIAL

## 2025-04-11 DIAGNOSIS — K74.60 CHRONIC HEPATITIS B WITH CIRRHOSIS (H): ICD-10-CM

## 2025-04-11 DIAGNOSIS — B18.1 CHRONIC HEPATITIS B WITH CIRRHOSIS (H): ICD-10-CM

## 2025-04-11 PROCEDURE — 76705 ECHO EXAM OF ABDOMEN: CPT | Mod: TC | Performed by: RADIOLOGY

## 2025-04-17 ENCOUNTER — OFFICE VISIT (OUTPATIENT)
Dept: GASTROENTEROLOGY | Facility: CLINIC | Age: 65
End: 2025-04-17
Payer: COMMERCIAL

## 2025-04-17 VITALS
WEIGHT: 123.7 LBS | HEIGHT: 67 IN | HEART RATE: 59 BPM | SYSTOLIC BLOOD PRESSURE: 117 MMHG | DIASTOLIC BLOOD PRESSURE: 71 MMHG | OXYGEN SATURATION: 98 % | BODY MASS INDEX: 19.42 KG/M2

## 2025-04-17 DIAGNOSIS — D69.6 THROMBOCYTOPENIA: ICD-10-CM

## 2025-04-17 DIAGNOSIS — K74.60 CHRONIC HEPATITIS B WITH CIRRHOSIS (H): Primary | ICD-10-CM

## 2025-04-17 DIAGNOSIS — K82.4 GALLBLADDER POLYP: ICD-10-CM

## 2025-04-17 DIAGNOSIS — B18.1 CHRONIC HEPATITIS B WITH CIRRHOSIS (H): Primary | ICD-10-CM

## 2025-04-17 RX ORDER — ENTECAVIR 0.5 MG/1
1 TABLET, FILM COATED ORAL DAILY
Qty: 180 TABLET | Refills: 3 | Status: SHIPPED | OUTPATIENT
Start: 2025-04-17

## 2025-04-17 ASSESSMENT — PAIN SCALES - GENERAL: PAINLEVEL_OUTOF10: NO PAIN (0)

## 2025-04-17 NOTE — NURSING NOTE
"Chief Complaint   Patient presents with    Follow Up     Follow up for Hepatitis B.     He requests these members of his care team be copied on today's visit information:  PCP: Elliot Nguyễn    Referring Provider:  Referred Self, MD  No address on file    Vitals:    04/17/25 0751   BP: 117/71   BP Location: Left arm   Patient Position: Sitting   Cuff Size: Adult Regular   Pulse: 59   SpO2: 98%   Weight: 56.1 kg (123 lb 11.2 oz)   Height: 1.702 m (5' 7\")     Medications were reconciled.        Supriya Schuster CMA        "

## 2025-04-17 NOTE — PROGRESS NOTES
Hepatology Clinic Note  Judah Diaz   Date of Birth 1960    REASON FOR FOLLOW UP: Hepatitis B         Assessment/plan:   Judah Diaz is a 65 YO M with PMHx significant for gallbladder polyp and irregular pupil of right eye who presents for follow-up in regards to cirrhosis and chronic hepatitis B.  He was last seen in clinic by Dr. Young September 2023.    Cirrhosis, compensated  Thrombocytopenia, chronic  - dx 2015  - MELD 3.0: 8  - HBV related   - hx ascites, resolved with antiviral therapy  - no hx variceal bleed   - no hx HE  - EGD Jun 2021- small EV, moderate portal hypertensive gastropathy   > Dr. Young recommended repeat in 2 yrs but this was not done; recommended scheduling EGD today but pt prefers to defer at this time; discussed the risks of not pursuing repeat EGD in depth   - HCC screening-US abd limited 4/11/25: Coarse appearing liver w/o focal worrisome hepatic lesion.  Gallbladder polyp again noted, 4 mm.   > AFP <1.8 4/11/25     HBV, chronic  - dx 2015  - GT-C  - eAg negative, eAb positive  - on entecavir since 2015  - HBV DNA undetectable April 2025    PLAN:  - Reviewed recent labs and imaging  - Sent in refill of Entecavir; continue to take as prescribed  - Per patient request, would like to proceed with MRI of the liver for next HCC screening; this was ordered and will be due October 2025  - Continue HCC screening routine every 6 months  - Recommended EGD for variceal screening but pt ultimately deferred at this time  - Advised patient he will be due for screening colonoscopy in 2029  - Continue to monitor labs every 6 months  - Continue to avoid alcohol  - Avoid all NSAID use; do not exceed 2000 mg Tylenol in 24-hour period  - Low-sodium, high-protein diet  - Follow with PCP routinely as recommended   > Did advise patient to monitor kidney function with PCP   - No need to schedule patient in liver transplant eval clinic with compensated disease and low MELD score    RTC 6  "months with labs and MRI same day; pt requesting to follow-up with Hepatologist    Alma Fofana PA-C  [facility]   -----------------------------------------------------         HPI:   Judah Diaz is a 65 YO M who presents for follow-up in regards to chronic hepatitis B and cirrhosis.     Patient comes to clinic this morning with his significant other.     Last visit with Dr. Young was 9/27/23.     No new medications, ER visits or hospital admissions since last visit.     Patient is well today.  He has no symptoms related to liver disease.      Patient denies rash, joint symptoms, lower extremity edema, abdominal distension, lethargy or confusion. Does admit to noticing \"yellowing\" on the soles of his feet.  He questions whether or not this is related to aging.  Otherwise, denies jaundice yellowing to his skin or eyes.      Patient denies melena, hematemesis or hematochezia.     Patient denies fevers, sweats or chills.       Weight stable. Tends to fluctuate between 120-125 lbs. Appetite normal.     Patient is adherent to his medications. The only medication he takes is Entecavir. He is asking for refill.     Patient does not drink alcohol.    PMH:    has a past medical history of Chronic hepatitis B (H), Cirrhosis (H), Perforation of tympanic membrane, unspecified, and Pupil irregular of right eye (2012).     SMH:    has a past surgical history that includes no history of surgery; Esophagoscopy, gastroscopy, duodenoscopy (EGD), combined (N/A, 1/6/2016); Esophagoscopy, gastroscopy, duodenoscopy (EGD), combined (N/A, 8/16/2018); Colonoscopy (N/A, 10/24/2019); and Esophagoscopy, gastroscopy, duodenoscopy (EGD), combined (N/A, 6/3/2021).     Medications:   Current Outpatient Medications   Medication Sig Dispense Refill    entecavir (BARACLUDE) 0.5 MG tablet Take 2 tablets (1 mg) by mouth daily. Appt needed for further refills 180 tablet 3     No current facility-administered " "medications for this visit.     Previous work-up:   Lab Results   Component Value Date    HEPBANG Reactive (A) 10/09/2015    HBCAB (A) 10/09/2015     Reactive   A reactive result indicates acute, chronic or past/resolved hepatitis B   infection.      HCVAB  12/09/2014     Nonreactive   Assay performance characteristics have not been established for newborns,   infants, and children      DEBORAH 693 (H) 10/09/2015    IRONSAT 92 (H) 10/09/2015     10/19/2015    CER 29 10/09/2015    CHOL 196 12/09/2014    HDL 70 12/09/2014     12/09/2014    TRIG 86 12/09/2014      No results found for: \"SPECDES\", \"LDRESULTS\"    Recent Labs   Lab Test 04/11/25  0808 09/21/23  0720 12/16/22  0903 03/17/22  1011 04/11/21  0949 02/19/20  0801 07/22/19  0643 01/15/19  0704 07/15/18  1219 01/08/18  0903   ALKPHOS 68 68 68 69 79 66 64 62 58 56   ALT 21 31 29 28 32 30 30 25 32 29   AST 25 33 23 19 24 22 20 20 30 23   BILITOTAL 0.8 0.9 1.1 0.8 1.0 1.3 1.1 0.7 0.8 1.0           Allergies:   No Known Allergies         Social History:     Social History     Socioeconomic History    Marital status:      Spouse name: Ashlyn    Number of children: Not on file    Years of education: Not on file    Highest education level: Not on file   Occupational History    Not on file   Tobacco Use    Smoking status: Never    Smokeless tobacco: Never   Vaping Use    Vaping status: Never Used   Substance and Sexual Activity    Alcohol use: No     Alcohol/week: 0.0 standard drinks of alcohol    Drug use: No    Sexual activity: Never   Other Topics Concern    Parent/sibling w/ CABG, MI or angioplasty before 65F 55M? Not Asked   Social History Narrative    Not on file     Social Drivers of Health     Financial Resource Strain: Not on file   Food Insecurity: Not on file   Transportation Needs: Not on file   Physical Activity: Not on file   Stress: Not on file   Social Connections: Not on file   Interpersonal Safety: Not on file   Housing Stability: " "Not on file          Family History:     Family History   Problem Relation Age of Onset    Hypertension Sister         ?    Family History Negative Other         much unknown since left home country    Cerebrovascular Disease No family hx of     C.A.D. No family hx of     Diabetes No family hx of     Cancer No family hx of     Liver Disease No family hx of     Liver Cancer No family hx of           Review of Systems:   Gen: See HPI          Physical Exam:   Vital signs:      BP: 117/71 Pulse: 59     SpO2: 98 %     Height: 170.2 cm (5' 7\") Weight: 56.1 kg (123 lb 11.2 oz)  Estimated body mass index is 19.37 kg/m  as calculated from the following:    Height as of this encounter: 1.702 m (5' 7\").    Weight as of this encounter: 56.1 kg (123 lb 11.2 oz).  Gen: A&Ox3, NAD  HEENT: Sclera anicteric  CV: RRR, no overt murmurs  Lung: non-labored breathing   Ext: no edema, intact pulses.   Skin: No rash or jaundice; nothing abnormal noted in regards to skin on L foot  Neuro: grossly intact  Psych: appropriate mood and affects         Data:   Reviewed in person and significant for:    Lab Results   Component Value Date     04/11/2025     04/11/2021      Lab Results   Component Value Date    POTASSIUM 4.3 04/11/2025    POTASSIUM 4.1 12/16/2022    POTASSIUM 4.3 04/11/2021     Lab Results   Component Value Date    CHLORIDE 104 04/11/2025    CHLORIDE 108 12/16/2022    CHLORIDE 107 04/11/2021     Lab Results   Component Value Date    CO2 26 04/11/2025    CO2 28 12/16/2022    CO2 31 04/11/2021     Lab Results   Component Value Date    BUN 20.4 04/11/2025    BUN 24 12/16/2022    BUN 21 04/11/2021     Lab Results   Component Value Date    CR 1.22 04/11/2025    CR 1.00 04/11/2021     Lab Results   Component Value Date    WBC 3.3 04/11/2025    WBC 3.4 04/11/2021     Lab Results   Component Value Date    HGB 16.0 04/11/2025    HGB 16.4 04/11/2021     Lab Results   Component Value Date    HCT 46.0 04/11/2025    HCT 48.5 " "04/11/2021     Lab Results   Component Value Date    MCV 87 04/11/2025    MCV 88 04/11/2021     Lab Results   Component Value Date     04/11/2025     04/11/2021     Lab Results   Component Value Date    AST 25 04/11/2025    AST 24 04/11/2021     Lab Results   Component Value Date    ALT 21 04/11/2025    ALT 32 04/11/2021     No results found for: \"BILICONJ\"   Lab Results   Component Value Date    BILITOTAL 0.8 04/11/2025    BILITOTAL 1.0 04/11/2021       Lab Results   Component Value Date    ALBUMIN 4.2 04/11/2025    ALBUMIN 3.7 12/16/2022    ALBUMIN 4.0 04/11/2021     Lab Results   Component Value Date    PROTTOTAL 6.8 04/11/2025    PROTTOTAL 7.3 04/11/2021      Lab Results   Component Value Date    ALKPHOS 68 04/11/2025    ALKPHOS 79 04/11/2021     Lab Results   Component Value Date    INR 1.02 04/11/2025    INR 0.95 04/11/2021       Imaging:       US ABDOMEN LIMITED  LOCATION: [facility]  DATE: 4/11/2025     INDICATION: HCC screening  COMPARISON: 4/2/2024.  TECHNIQUE: Limited abdominal ultrasound.     FINDINGS:     GALLBLADDER: No gallstones. Negative sonographic Funes's sign. Incidental small gallbladder polyp again seen that is 4 mm.     BILE DUCTS: No biliary dilatation. The common duct measures 8 mm.     LIVER: Coarsened echotexture, suggesting underlying fibrosis. Smooth contour. No focal observation identified. Previously noted cyst not seen. The portal vein is patent with flow in the normal direction.     RIGHT KIDNEY: No hydronephrosis.     PANCREAS: The pancreas is largely obscured by overlying gas.     No ascites.                                                                 IMPRESSION:  1.  Coarse appearing liver. No focal worrisome hepatic lesion.  2.  Common duct is mildly ectatic measuring 8 mm.  3.  Gallbladder polyp again identified.     US LI-RADS Category for high risk liver surveillance: US-1 Negative or definitely benign observation.     American " College of Radiology Committee on LI-RADS v2017.

## 2025-04-17 NOTE — LETTER
4/17/2025      Judah Diaz  [address]  [place] [address]      Dear Colleague,    Thank you for referring your patient, Judah Diaz, to the [facility]. Please see a copy of my visit note below.    Hepatology Clinic Note  Judah Diaz   Date of Birth 1960    REASON FOR FOLLOW UP: Hepatitis B         Assessment/plan:   Judah Diaz is a 65 YO M with PMHx significant for gallbladder polyp and irregular pupil of right eye who presents for follow-up in regards to cirrhosis and chronic hepatitis B.  He was last seen in clinic by Dr. Young September 2023.    Cirrhosis, compensated  Thrombocytopenia, chronic  - dx 2015  - MELD 3.0: 8  - HBV related   - hx ascites, resolved with antiviral therapy  - no hx variceal bleed   - no hx HE  - EGD Jun 2021- small EV, moderate portal hypertensive gastropathy   > Dr. Young recommended repeat in 2 yrs but this was not done; recommended scheduling EGD today but pt prefers to defer at this time; discussed the risks of not pursuing repeat EGD in depth   - HCC screening-US abd limited 4/11/25: Coarse appearing liver w/o focal worrisome hepatic lesion.  Gallbladder polyp again noted, 4 mm.   > AFP <1.8 4/11/25     HBV, chronic  - dx 2015  - GT-C  - eAg negative, eAb positive  - on entecavir since 2015  - HBV DNA undetectable April 2025    PLAN:  - Reviewed recent labs and imaging  - Sent in refill of Entecavir; continue to take as prescribed  - Per patient request, would like to proceed with MRI of the liver for next HCC screening; this was ordered and will be due October 2025  - Continue HCC screening routine every 6 months  - Recommended EGD for variceal screening but pt ultimately deferred at this time  - Advised patient he will be due for screening colonoscopy in 2029  - Continue to monitor labs every 6 months  - Continue to avoid alcohol  - Avoid all NSAID use; do not exceed 2000 mg Tylenol in  "24-hour period  - Low-sodium, high-protein diet  - Follow with PCP routinely as recommended   > Did advise patient to monitor kidney function with PCP   - No need to schedule patient in liver transplant eval clinic with compensated disease and low MELD score    RTC 6 months with labs and MRI same day; pt requesting to follow-up with Hepatologist    Alma Fofana PA-C  [facility]   -----------------------------------------------------         HPI:   Judah Diaz is a 63 YO M who presents for follow-up in regards to chronic hepatitis B and cirrhosis.     Patient comes to clinic this morning with his significant other.     Last visit with Dr. Young was 9/27/23.     No new medications, ER visits or hospital admissions since last visit.     Patient is well today.  He has no symptoms related to liver disease.      Patient denies rash, joint symptoms, lower extremity edema, abdominal distension, lethargy or confusion. Does admit to noticing \"yellowing\" on the soles of his feet.  He questions whether or not this is related to aging.  Otherwise, denies jaundice yellowing to his skin or eyes.      Patient denies melena, hematemesis or hematochezia.     Patient denies fevers, sweats or chills.       Weight stable. Tends to fluctuate between 120-125 lbs. Appetite normal.     Patient is adherent to his medications. The only medication he takes is Entecavir. He is asking for refill.     Patient does not drink alcohol.    PMH:    has a past medical history of Chronic hepatitis B (H), Cirrhosis (H), Perforation of tympanic membrane, unspecified, and Pupil irregular of right eye (2012).     SMH:    has a past surgical history that includes no history of surgery; Esophagoscopy, gastroscopy, duodenoscopy (EGD), combined (N/A, 1/6/2016); Esophagoscopy, gastroscopy, duodenoscopy (EGD), combined (N/A, 8/16/2018); Colonoscopy (N/A, 10/24/2019); and Esophagoscopy, gastroscopy, duodenoscopy (EGD), " "combined (N/A, 6/3/2021).     Medications:   Current Outpatient Medications   Medication Sig Dispense Refill     entecavir (BARACLUDE) 0.5 MG tablet Take 2 tablets (1 mg) by mouth daily. Appt needed for further refills 180 tablet 3     No current facility-administered medications for this visit.     Previous work-up:   Lab Results   Component Value Date    HEPBANG Reactive (A) 10/09/2015    HBCAB (A) 10/09/2015     Reactive   A reactive result indicates acute, chronic or past/resolved hepatitis B   infection.      HCVAB  12/09/2014     Nonreactive   Assay performance characteristics have not been established for newborns,   infants, and children      DEBORAH 693 (H) 10/09/2015    IRONSAT 92 (H) 10/09/2015     10/19/2015    CER 29 10/09/2015    CHOL 196 12/09/2014    HDL 70 12/09/2014     12/09/2014    TRIG 86 12/09/2014      No results found for: \"SPECDES\", \"LDRESULTS\"    Recent Labs   Lab Test 04/11/25  0808 09/21/23  0720 12/16/22  0903 03/17/22  1011 04/11/21  0949 02/19/20  0801 07/22/19  0643 01/15/19  0704 07/15/18  1219 01/08/18  0903   ALKPHOS 68 68 68 69 79 66 64 62 58 56   ALT 21 31 29 28 32 30 30 25 32 29   AST 25 33 23 19 24 22 20 20 30 23   BILITOTAL 0.8 0.9 1.1 0.8 1.0 1.3 1.1 0.7 0.8 1.0           Allergies:   No Known Allergies         Social History:     Social History     Socioeconomic History     Marital status:      Spouse name: Ashlyn     Number of children: Not on file     Years of education: Not on file     Highest education level: Not on file   Occupational History     Not on file   Tobacco Use     Smoking status: Never     Smokeless tobacco: Never   Vaping Use     Vaping status: Never Used   Substance and Sexual Activity     Alcohol use: No     Alcohol/week: 0.0 standard drinks of alcohol     Drug use: No     Sexual activity: Never   Other Topics Concern     Parent/sibling w/ CABG, MI or angioplasty before 65F 55M? Not Asked   Social History Narrative     Not on file " "    Social Drivers of Health     Financial Resource Strain: Not on file   Food Insecurity: Not on file   Transportation Needs: Not on file   Physical Activity: Not on file   Stress: Not on file   Social Connections: Not on file   Interpersonal Safety: Not on file   Housing Stability: Not on file          Family History:     Family History   Problem Relation Age of Onset     Hypertension Sister         ?     Family History Negative Other         much unknown since left home country     Cerebrovascular Disease No family hx of      C.A.D. No family hx of      Diabetes No family hx of      Cancer No family hx of      Liver Disease No family hx of      Liver Cancer No family hx of           Review of Systems:   Gen: See HPI          Physical Exam:   Vital signs:      BP: 117/71 Pulse: 59     SpO2: 98 %     Height: 170.2 cm (5' 7\") Weight: 56.1 kg (123 lb 11.2 oz)  Estimated body mass index is 19.37 kg/m  as calculated from the following:    Height as of this encounter: 1.702 m (5' 7\").    Weight as of this encounter: 56.1 kg (123 lb 11.2 oz).  Gen: A&Ox3, NAD  HEENT: Sclera anicteric  CV: RRR, no overt murmurs  Lung: non-labored breathing   Ext: no edema, intact pulses.   Skin: No rash or jaundice; nothing abnormal noted in regards to skin on L foot  Neuro: grossly intact  Psych: appropriate mood and affects         Data:   Reviewed in person and significant for:    Lab Results   Component Value Date     04/11/2025     04/11/2021      Lab Results   Component Value Date    POTASSIUM 4.3 04/11/2025    POTASSIUM 4.1 12/16/2022    POTASSIUM 4.3 04/11/2021     Lab Results   Component Value Date    CHLORIDE 104 04/11/2025    CHLORIDE 108 12/16/2022    CHLORIDE 107 04/11/2021     Lab Results   Component Value Date    CO2 26 04/11/2025    CO2 28 12/16/2022    CO2 31 04/11/2021     Lab Results   Component Value Date    BUN 20.4 04/11/2025    BUN 24 12/16/2022    BUN 21 04/11/2021     Lab Results   Component Value Date " "   CR 1.22 04/11/2025    CR 1.00 04/11/2021     Lab Results   Component Value Date    WBC 3.3 04/11/2025    WBC 3.4 04/11/2021     Lab Results   Component Value Date    HGB 16.0 04/11/2025    HGB 16.4 04/11/2021     Lab Results   Component Value Date    HCT 46.0 04/11/2025    HCT 48.5 04/11/2021     Lab Results   Component Value Date    MCV 87 04/11/2025    MCV 88 04/11/2021     Lab Results   Component Value Date     04/11/2025     04/11/2021     Lab Results   Component Value Date    AST 25 04/11/2025    AST 24 04/11/2021     Lab Results   Component Value Date    ALT 21 04/11/2025    ALT 32 04/11/2021     No results found for: \"BILICONJ\"   Lab Results   Component Value Date    BILITOTAL 0.8 04/11/2025    BILITOTAL 1.0 04/11/2021       Lab Results   Component Value Date    ALBUMIN 4.2 04/11/2025    ALBUMIN 3.7 12/16/2022    ALBUMIN 4.0 04/11/2021     Lab Results   Component Value Date    PROTTOTAL 6.8 04/11/2025    PROTTOTAL 7.3 04/11/2021      Lab Results   Component Value Date    ALKPHOS 68 04/11/2025    ALKPHOS 79 04/11/2021     Lab Results   Component Value Date    INR 1.02 04/11/2025    INR 0.95 04/11/2021       Imaging:       US ABDOMEN LIMITED  LOCATION: [facility]  DATE: 4/11/2025     INDICATION: HCC screening  COMPARISON: 4/2/2024.  TECHNIQUE: Limited abdominal ultrasound.     FINDINGS:     GALLBLADDER: No gallstones. Negative sonographic Funes's sign. Incidental small gallbladder polyp again seen that is 4 mm.     BILE DUCTS: No biliary dilatation. The common duct measures 8 mm.     LIVER: Coarsened echotexture, suggesting underlying fibrosis. Smooth contour. No focal observation identified. Previously noted cyst not seen. The portal vein is patent with flow in the normal direction.     RIGHT KIDNEY: No hydronephrosis.     PANCREAS: The pancreas is largely obscured by overlying gas.     No ascites.                                                               "   IMPRESSION:  1.  Coarse appearing liver. No focal worrisome hepatic lesion.  2.  Common duct is mildly ectatic measuring 8 mm.  3.  Gallbladder polyp again identified.     US LI-RADS Category for high risk liver surveillance: US-1 Negative or definitely benign observation.     American College of Radiology Committee on LI-RADS v2017.         Again, thank you for allowing me to participate in the care of your patient.        Sincerely,        Alma Fofana PA-C    Electronically signed

## 2025-04-17 NOTE — Clinical Note
Follow up at [facility] in 6 months with Hepatologist with labs and MRI same day (pt requesting hepatologist)

## 2025-05-08 NOTE — TELEPHONE ENCOUNTER
Returned patient's call.  Resent entecavir 0.5 mg tab to [facility] [place], per patient request.    Flora MIRANDA LPN  Hepatology Clinic        Health Call Center    Phone Message    May a detailed message be left on voicemail: yes     Reason for Call: Medication Refill Request    Has the patient contacted the pharmacy for the refill? Yes   Name of medication being requested: entecavir (BARACLUDE) 0.5 MG tablet  Provider who prescribed the medication: Dr. Young  Pharmacy:[facility]  Date medication is needed: ASAP/ TODAY     Patient is out of medication  Please call patient at [phone] or [phone] when ready.    Action Taken: Message routed to:  [facility] P Hep    Travel Screening: Not Applicable                                                                      
Detail Level: Generalized
Detail Level: Simple

## 2025-07-28 ENCOUNTER — RESULTS FOLLOW-UP (OUTPATIENT)
Dept: FAMILY MEDICINE | Facility: CLINIC | Age: 65
End: 2025-07-28

## 2025-07-28 ENCOUNTER — OFFICE VISIT (OUTPATIENT)
Dept: FAMILY MEDICINE | Facility: CLINIC | Age: 65
End: 2025-07-28
Payer: COMMERCIAL

## 2025-07-28 VITALS
SYSTOLIC BLOOD PRESSURE: 96 MMHG | OXYGEN SATURATION: 99 % | TEMPERATURE: 97.8 F | DIASTOLIC BLOOD PRESSURE: 64 MMHG | RESPIRATION RATE: 16 BRPM | BODY MASS INDEX: 19.43 KG/M2 | HEIGHT: 65 IN | HEART RATE: 74 BPM | WEIGHT: 116.6 LBS

## 2025-07-28 DIAGNOSIS — K74.60 CHRONIC HEPATITIS B WITH CIRRHOSIS (H): ICD-10-CM

## 2025-07-28 DIAGNOSIS — R79.89 ELEVATED SERUM CREATININE: ICD-10-CM

## 2025-07-28 DIAGNOSIS — G44.209 TENSION HEADACHE: Primary | ICD-10-CM

## 2025-07-28 DIAGNOSIS — B18.1 CHRONIC HEPATITIS B WITH CIRRHOSIS (H): ICD-10-CM

## 2025-07-28 LAB
ANION GAP SERPL CALCULATED.3IONS-SCNC: <1 MMOL/L (ref 3–14)
BUN SERPL-MCNC: 19 MG/DL (ref 7–30)
CALCIUM SERPL-MCNC: 9.3 MG/DL (ref 8.5–10.1)
CHLORIDE BLD-SCNC: 105 MMOL/L (ref 94–109)
CO2 SERPL-SCNC: 28 MMOL/L (ref 20–32)
CREAT SERPL-MCNC: 1.3 MG/DL (ref 0.66–1.25)
EGFRCR SERPLBLD CKD-EPI 2021: 61 ML/MIN/1.73M2
GLUCOSE BLD-MCNC: 140 MG/DL (ref 70–99)
POTASSIUM BLD-SCNC: 4.1 MMOL/L (ref 3.4–5.3)
SODIUM SERPL-SCNC: 133 MMOL/L (ref 135–145)

## 2025-07-28 PROCEDURE — 80048 BASIC METABOLIC PNL TOTAL CA: CPT | Performed by: PHYSICIAN ASSISTANT

## 2025-07-28 PROCEDURE — 1125F AMNT PAIN NOTED PAIN PRSNT: CPT | Performed by: PHYSICIAN ASSISTANT

## 2025-07-28 PROCEDURE — 99214 OFFICE O/P EST MOD 30 MIN: CPT | Performed by: PHYSICIAN ASSISTANT

## 2025-07-28 PROCEDURE — 36415 COLL VENOUS BLD VENIPUNCTURE: CPT | Performed by: PHYSICIAN ASSISTANT

## 2025-07-28 PROCEDURE — 3078F DIAST BP <80 MM HG: CPT | Performed by: PHYSICIAN ASSISTANT

## 2025-07-28 PROCEDURE — 3074F SYST BP LT 130 MM HG: CPT | Performed by: PHYSICIAN ASSISTANT

## 2025-07-28 RX ORDER — METHOCARBAMOL 500 MG/1
500 TABLET, FILM COATED ORAL 4 TIMES DAILY PRN
Qty: 12 TABLET | Refills: 0 | Status: SHIPPED | OUTPATIENT
Start: 2025-07-28

## 2025-07-28 RX ORDER — NAPROXEN 500 MG/1
500 TABLET ORAL EVERY 12 HOURS PRN
Qty: 14 TABLET | Refills: 0 | Status: SHIPPED | OUTPATIENT
Start: 2025-07-28

## 2025-07-28 ASSESSMENT — PAIN SCALES - GENERAL: PAINLEVEL_OUTOF10: SEVERE PAIN (9)

## 2025-07-28 ASSESSMENT — ENCOUNTER SYMPTOMS: HEADACHES: 1

## 2025-07-28 NOTE — TELEPHONE ENCOUNTER
RN called and spoke with patient. Relayed below provider message as written. Pt verbalized understanding and agrees with plan. Pt understands he needs to provide an update in 3 day if not improving.       Pt verbalized understanding and agrees with plan.         Ahslyn Bustillos RN    [facility]

## 2025-07-28 NOTE — PATIENT INSTRUCTIONS
You will receive a phone call today with results. This will depend on what medication we will use.    Call or send a MyChart message if you aren't seeing improvement in 3 days.      At [facility], we strive to deliver an exceptional experience to you, every time we see you. If you receive a survey, please let us know what we are doing well and/or what we could improve upon, as we do value your feedback.  If you have MyChart, you can expect to receive results automatically within 24 hours of their completion.  Your provider will send a note interpreting your results as well.   If you do not have MyChart, you should receive your results in about a week by mail.    Your care team:                            Family Medicine Internal Medicine   MD Tolu Reid, MD Lorene Davis, MD Ravindra Byrne, MD Lorena Brewster, JOHNNA Palomino, MD Pediatrics   Keysha Alonso, MD Ashley Fitzgerald, MD Ashlyn Vasquez, APRGENO CNP Qiana BURNS CNP   Rosaura Diaz, MD Ivet Cuevas, MD Chantale Vallecillo, CNP     Benja Potter, CNP Same-Day Provider (No follow-up visits)   GRANT Stratton, DNP JOHNNA Corrales APRN, FNP, BC Kirstie Burr PA-C     Clinic hours: Monday - Thursday 7 am-6 pm; Fridays 7 am-5 pm.   Urgent care: Monday - Friday 10 am- 8 pm; Saturday and Sunday 9 am-5 pm.    Clinic: [phone]       [facility]: Monday - Thursday 8 am - 7 pm; Friday 8 am - 6 pm  [facility]: [phone]

## 2025-07-28 NOTE — PROGRESS NOTES
"  Assessment & Plan     Tension headache  On day 10 of symptoms, not really having associated symptoms with this.  Question level of dehydration with this coming on after exerted activity and associated cramping at that time.  Discussed anti-inflammatories but recommended checking BMP first considering elevation of his last check.  We also discussed alternative of muscle relaxer if not effective.  This was done and similar to previous check will use naproxen sparingly as well as methocarbamol trial.  Nursing to call with results.  Considering he has had similar headaches in the past, discussed likely no need for neuro imaging however if not resolving should follow-up in 3 days and consider imaging at that point or if he develops worsening.    Elevated serum creatinine  Incidentally elevated at last check.  - Basic metabolic panel  (Ca, Cl, CO2, Creat, Gluc, K, Na, BUN); Future  - Basic metabolic panel  (Ca, Cl, CO2, Creat, Gluc, K, Na, BUN)    Chronic hepatitis B with cirrhosis (H)  Reviewed most recent hepatology note.          Jennifer Rodriguez is a 65 year old, presenting for the following health issues:  Headache (Headache at temples Headache for about 10 days)        7/28/2025    12:42 PM   Additional Questions   Roomed by Milagros   Accompanied by wife         7/28/2025    12:42 PM   Patient Reported Additional Medications   Patient reports taking the following new medications no     Headache     History of Present Illness       Headaches:   Since the patient's last clinic visit, headaches are: worsened  The patient is getting headaches:  Occasionally  He is able to do normal daily activities when he has a migraine.  The patient is taking the following rescue/relief medications:  No rescue/relief medications   Patient states \"I get no relief\" from the rescue/relief medications.   The patient is taking the following medications to prevent migraines:  No medications to prevent migraines  In the past 4 weeks, the " patient has gone to an Urgent Care or Emergency Room 0 times times due to headaches.    He eats 2-3 servings of fruits and vegetables daily.He exercises with enough effort to increase his heart rate 30 to 60 minutes per day.    He is taking medications regularly.          Concern - Headache  Onset: 9 days ago  Description: Pain at temples  Intensity: severe  Progression of Symptoms:  worsening  Accompanying Signs & Symptoms: no  Previous history of similar problem: Occasionally , but did not last this long  Precipitating factors:        Worsened by: Nothing  Alleviating factors:        Improved by: Relaxing, Breathing fresh air  Therapies tried and outcome: None    Headaches started 7/19/2025.  Was playing tennis that day but actually stopped playing as he was getting a cramp in his leg, this has just started to improve.  That evening he started a headache to bilateral temples, worse on the left.  Headache has been constant since then.  Does make it harder to sleep at night.  Sometimes in the morning feels a different sensation but unsure if it is dizziness or not, does not continue throughout the day.  Headache does not seem to change based on position or activity.  No associated vision changes, hearing changes, nausea, vomiting, URI symptoms.  He has not tried anything over-the-counter for his symptoms.  Notes he has had similar headaches in the past but they have not been as severe or lasting.    Has not been seen by primary care in some time.  Does follow with hepatology for chronic hepatitis B with cirrhosis.  Last visit with them 4/17/2025 he did have slight increase in his creatinine.  He does not seem aware of this.    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          Objective    BP 96/64 (BP Location: Left arm, Patient Position: Sitting, Cuff Size: Adult Regular)   Pulse 74   Temp 97.8  F (36.6  C) (Temporal)   Resp  "16    1.655 m (5' 5.16\")   Wt 52.9 kg (116 lb 9.6 oz)   SpO2 99%   BMI 19.31 kg/m    Body mass index is 19.31 kg/m .  Physical Exam   GENERAL: alert and no distress  EYES: Eyes grossly normal to inspection, PERRL and conjunctivae and sclerae normal  HENT: normal cephalic/atraumatic, right ear: TM perforated, no drainage or erythema, left ear: normal: no effusions, no erythema, normal landmarks, nose and mouth without ulcers or lesions, oropharynx with mild erythema, and oral mucous membranes moist  NECK: no adenopathy, no asymmetry, masses, or scars  RESP: lungs clear to auscultation - no rales, rhonchi or wheezes  CV: regular rate and rhythm, normal S1 S2, no S3 or S4, no murmur, click or rub, no peripheral edema   MS: no gross musculoskeletal defects noted, no edema  SKIN: no suspicious lesions or rashes  NEURO: Normal balance.  Cranial nerves II through XII intact.   strength intact.  Pronator drift negative.            Signed Electronically by: Kirstie Burr PA-C    "

## (undated) RX ORDER — DIPHENHYDRAMINE HYDROCHLORIDE 50 MG/ML
INJECTION INTRAMUSCULAR; INTRAVENOUS
Status: DISPENSED
Start: 2019-10-24

## (undated) RX ORDER — FENTANYL CITRATE 50 UG/ML
INJECTION, SOLUTION INTRAMUSCULAR; INTRAVENOUS
Status: DISPENSED
Start: 2019-10-24

## (undated) RX ORDER — FENTANYL CITRATE 50 UG/ML
INJECTION, SOLUTION INTRAMUSCULAR; INTRAVENOUS
Status: DISPENSED
Start: 2018-08-16

## (undated) RX ORDER — SIMETHICONE 20 MG/.3ML
EMULSION ORAL
Status: DISPENSED
Start: 2018-08-16

## (undated) RX ORDER — DIPHENHYDRAMINE HYDROCHLORIDE 50 MG/ML
INJECTION INTRAMUSCULAR; INTRAVENOUS
Status: DISPENSED
Start: 2018-08-16

## (undated) RX ORDER — SIMETHICONE 20 MG/.3ML
EMULSION ORAL
Status: DISPENSED
Start: 2019-10-24

## (undated) RX ORDER — FENTANYL CITRATE 50 UG/ML
INJECTION, SOLUTION INTRAMUSCULAR; INTRAVENOUS
Status: DISPENSED
Start: 2021-06-03